# Patient Record
Sex: FEMALE | Race: WHITE | Employment: FULL TIME | URBAN - METROPOLITAN AREA
[De-identification: names, ages, dates, MRNs, and addresses within clinical notes are randomized per-mention and may not be internally consistent; named-entity substitution may affect disease eponyms.]

---

## 2017-01-05 ENCOUNTER — TELEPHONE (OUTPATIENT)
Dept: FAMILY MEDICINE CLINIC | Facility: CLINIC | Age: 42
End: 2017-01-05

## 2017-01-05 NOTE — TELEPHONE ENCOUNTER
Sahron/ [facility] dep asking to speak with a nurse. Smiley Sultana has questions regarding pt that needs to be answered for a form. Please advise.

## 2017-01-06 ENCOUNTER — TELEPHONE (OUTPATIENT)
Dept: HEMATOLOGY/ONCOLOGY | Facility: HOSPITAL | Age: 42
End: 2017-01-06

## 2017-01-06 NOTE — TELEPHONE ENCOUNTER
Nicole Clemente would like to know information regarding patients HIV risks. Informed her, patient is currently seeing Infectious disease.  Information for Dr. Wilkes Danger provided,

## 2017-01-06 NOTE — TELEPHONE ENCOUNTER
Left voicemail for patient on both of her phones listed that had call requesting her medical information. Left message that will not release without speaking to her first and consent.

## 2017-01-06 NOTE — TELEPHONE ENCOUNTER
PLEASE CALL [address] [facility] DEPT -[phone] REGARDING WHEN AND HOW MANY AND WHY PT   HAD TO HAVE BLOOD TRANSFUSIONS-MKV

## 2017-01-09 ENCOUNTER — TELEPHONE (OUTPATIENT)
Dept: HEMATOLOGY/ONCOLOGY | Facility: HOSPITAL | Age: 42
End: 2017-01-09

## 2017-01-09 NOTE — TELEPHONE ENCOUNTER
Pt called and to clarify that [facility] Department was calling due to she is requesting financial assistance with her medication. Pt to call Daniel Burnett at [facility] Department.

## 2017-01-10 ENCOUNTER — OFFICE VISIT (OUTPATIENT)
Dept: NUTRITION/OBESITY MEDICINE | Facility: HOSPITAL | Age: 42
End: 2017-01-10
Attending: SURGERY
Payer: COMMERCIAL

## 2017-01-10 VITALS
BODY MASS INDEX: 33.18 KG/M2 | RESPIRATION RATE: 18 BRPM | SYSTOLIC BLOOD PRESSURE: 117 MMHG | HEART RATE: 90 BPM | DIASTOLIC BLOOD PRESSURE: 78 MMHG | OXYGEN SATURATION: 100 % | WEIGHT: 211.38 LBS | HEIGHT: 67 IN

## 2017-01-10 NOTE — PROGRESS NOTES
[address] [place] [address] AND [facility]  [address] [place] [address]  Dept: [phone]  Loc: [phone]    1/10/2017    BARIATRIC EXISTING PATIENT/FOLLOW UP    HPI:  Brian Fiore is a 42year old-year old femal

## 2017-01-17 ENCOUNTER — TELEPHONE (OUTPATIENT)
Dept: FAMILY MEDICINE CLINIC | Facility: CLINIC | Age: 42
End: 2017-01-17

## 2017-01-17 NOTE — TELEPHONE ENCOUNTER
Pt is calling state that the Infection Disease Dr is recommending pt to adjust some of her medication  Pt is requesting to speak with Dr [facility] to discuss

## 2017-01-19 ENCOUNTER — TELEPHONE (OUTPATIENT)
Dept: FAMILY MEDICINE CLINIC | Facility: CLINIC | Age: 42
End: 2017-01-19

## 2017-01-19 RX ORDER — BUPROPION HYDROCHLORIDE 150 MG/1
150 TABLET, EXTENDED RELEASE ORAL DAILY
Qty: 30 TABLET | Refills: 0 | Status: SHIPPED | OUTPATIENT
Start: 2017-01-19 | End: 2017-02-08

## 2017-01-19 NOTE — TELEPHONE ENCOUNTER
genvoya     Interaction with venlaflaxine  Switch to wellbutrin. Discussed side effects. D/c completley the xanax. F/u next week.

## 2017-01-24 NOTE — PROGRESS NOTES
I'm in a 3rd party that are paying for all my meds. Switched to wellbutrin and off the venlafaxine. Off zithromax  triumeo for a week.     Still with a lot of nausea  (Has been on it since September)    \"I'm hungry now my lap band is only at 3 cc and usu

## 2017-01-26 ENCOUNTER — OFFICE VISIT (OUTPATIENT)
Dept: AUDIOLOGY | Facility: CLINIC | Age: 42
End: 2017-01-26

## 2017-01-26 ENCOUNTER — OFFICE VISIT (OUTPATIENT)
Dept: OTOLARYNGOLOGY | Facility: CLINIC | Age: 42
End: 2017-01-26

## 2017-01-26 VITALS
SYSTOLIC BLOOD PRESSURE: 121 MMHG | HEART RATE: 85 BPM | TEMPERATURE: 97 F | HEIGHT: 67 IN | RESPIRATION RATE: 20 BRPM | DIASTOLIC BLOOD PRESSURE: 83 MMHG | WEIGHT: 215 LBS | BODY MASS INDEX: 33.74 KG/M2

## 2017-01-26 DIAGNOSIS — H69.82 EUSTACHIAN TUBE DYSFUNCTION, LEFT: Primary | ICD-10-CM

## 2017-01-26 DIAGNOSIS — H69.90 EUSTACHIAN TUBE DISORDER, UNSPECIFIED LATERALITY: Primary | ICD-10-CM

## 2017-01-26 PROCEDURE — 92550 TYMPANOMETRY & REFLEX THRESH: CPT | Performed by: AUDIOLOGIST

## 2017-01-26 PROCEDURE — 92557 COMPREHENSIVE HEARING TEST: CPT | Performed by: AUDIOLOGIST

## 2017-01-26 PROCEDURE — 99212 OFFICE O/P EST SF 10 MIN: CPT | Performed by: OTOLARYNGOLOGY

## 2017-01-26 PROCEDURE — 99213 OFFICE O/P EST LOW 20 MIN: CPT | Performed by: OTOLARYNGOLOGY

## 2017-01-26 RX ORDER — FLUTICASONE PROPIONATE 50 MCG
2 SPRAY, SUSPENSION (ML) NASAL DAILY
Qty: 1 BOTTLE | Refills: 3 | Status: SHIPPED | OUTPATIENT
Start: 2017-01-26 | End: 2017-05-05

## 2017-01-27 NOTE — PROGRESS NOTES
AUDIOLOGY REPORT      Souleymane Cohen is a 42year old female     Referring Provider: No ref. provider found   YOB: 1975  Medical Record: [id number]      Patient Hearing History:  Patient reported muffled hearing and sensation of fluid in ears.

## 2017-01-27 NOTE — PROGRESS NOTES
Jami Rogers is a 42year old female. Patient presents with:  Ear Problem: Pt c/o bilateral ear fluid to bilateral ears. Pt states her ears pops and sounds like under water whenever she yawns or takes a big bite.  Pt states she can feel vibrations going to uterine bleeding) 2002     and acute pelvic pain. Laparoscopy D&C, WOLFGANG   • Mild dysplasia of cervix 2005     Colposcopy with biopsy   • Endometriosis 2005     Stage 4, pelvic pain. Laparoscopic extensive WOLFGANG, chromopertubation.   Lupron x6 mos ('03, '06, Normal Memory - Normal. Cranial nerves - Cranial nerves II through XII grossly intact.    Neck Exam Normal Inspection - Normal. Palpation - Normal. Parotid gland - Normal. Thyroid gland - Normal.   Psychiatric Normal Orientation - Oriented to time, place, p

## 2017-02-01 ENCOUNTER — TELEPHONE (OUTPATIENT)
Dept: FAMILY MEDICINE CLINIC | Facility: CLINIC | Age: 42
End: 2017-02-01

## 2017-02-01 NOTE — TELEPHONE ENCOUNTER
PT stts refill on Rx Hydrocodone 10*325 MG for 150MG for 3 scripts that run into each other Feb,march,april and may. Pt stts he will  scripts at [address] [place] [address] location.  Please advise       Current Outpatient Prescriptions:  HYDROcodone-acetaminophen 10-32

## 2017-02-02 NOTE — TELEPHONE ENCOUNTER
Dr. [facility], last OV 1.23.17. Previous rx's were for a qty of 150. I pended 3 rx's. Please sign if you agree. Thanks.

## 2017-02-04 RX ORDER — HYDROCODONE BITARTRATE AND ACETAMINOPHEN 10; 325 MG/1; MG/1
1 TABLET ORAL EVERY 6 HOURS PRN
Qty: 150 TABLET | Refills: 0 | Status: SHIPPED | OUTPATIENT
Start: 2017-04-02 | End: 2017-02-14

## 2017-02-04 RX ORDER — HYDROCODONE BITARTRATE AND ACETAMINOPHEN 10; 325 MG/1; MG/1
1 TABLET ORAL EVERY 6 HOURS PRN
Qty: 150 TABLET | Refills: 0 | Status: SHIPPED | OUTPATIENT
Start: 2017-03-03 | End: 2017-04-02

## 2017-02-04 RX ORDER — HYDROCODONE BITARTRATE AND ACETAMINOPHEN 10; 325 MG/1; MG/1
1 TABLET ORAL EVERY 6 HOURS PRN
Qty: 150 TABLET | Refills: 0 | Status: SHIPPED | OUTPATIENT
Start: 2017-02-04 | End: 2017-03-06

## 2017-02-06 RX ORDER — DULAGLUTIDE 1.5 MG/.5ML
INJECTION, SOLUTION SUBCUTANEOUS
Qty: 4 PEN | Refills: 0 | Status: SHIPPED | OUTPATIENT
Start: 2017-02-06 | End: 2017-02-15

## 2017-02-08 RX ORDER — ERGOCALCIFEROL 1.25 MG/1
CAPSULE ORAL
Qty: 4 CAPSULE | Refills: 0 | Status: SHIPPED | OUTPATIENT
Start: 2017-02-08 | End: 2017-03-01

## 2017-02-08 NOTE — TELEPHONE ENCOUNTER
LOV 7/23/16 with RTC 6 months. No F/U scheduled. Letter sent to the patient today. 30 day refill pending.

## 2017-02-09 RX ORDER — BUPROPION HYDROCHLORIDE 150 MG/1
TABLET, EXTENDED RELEASE ORAL
Qty: 30 TABLET | Refills: 4 | Status: SHIPPED | OUTPATIENT
Start: 2017-02-09 | End: 2017-04-03

## 2017-02-14 ENCOUNTER — OFFICE VISIT (OUTPATIENT)
Dept: OBGYN CLINIC | Facility: CLINIC | Age: 42
End: 2017-02-14

## 2017-02-14 VITALS
SYSTOLIC BLOOD PRESSURE: 135 MMHG | HEART RATE: 80 BPM | DIASTOLIC BLOOD PRESSURE: 89 MMHG | BODY MASS INDEX: 33.82 KG/M2 | WEIGHT: 215.5 LBS | HEIGHT: 67 IN

## 2017-02-14 DIAGNOSIS — N76.0 BV (BACTERIAL VAGINOSIS): ICD-10-CM

## 2017-02-14 DIAGNOSIS — N89.8 VAGINAL ODOR: ICD-10-CM

## 2017-02-14 DIAGNOSIS — Z21 HIV POSITIVE (HCC): ICD-10-CM

## 2017-02-14 DIAGNOSIS — Z12.72 ENCOUNTER FOR SCREENING FOR MALIGNANT NEOPLASM OF VAGINA: ICD-10-CM

## 2017-02-14 DIAGNOSIS — B96.89 BV (BACTERIAL VAGINOSIS): ICD-10-CM

## 2017-02-14 PROCEDURE — 99213 OFFICE O/P EST LOW 20 MIN: CPT | Performed by: ADVANCED PRACTICE MIDWIFE

## 2017-02-14 PROCEDURE — 87210 SMEAR WET MOUNT SALINE/INK: CPT | Performed by: ADVANCED PRACTICE MIDWIFE

## 2017-02-14 RX ORDER — HYDROCODONE BITARTRATE AND ACETAMINOPHEN 10; 325 MG/1; MG/1
1 TABLET ORAL EVERY 4 HOURS PRN
Qty: 150 TABLET | Refills: 0 | Status: SHIPPED | OUTPATIENT
Start: 2017-04-15 | End: 2017-05-15

## 2017-02-14 RX ORDER — HYDROCODONE BITARTRATE AND ACETAMINOPHEN 10; 325 MG/1; MG/1
1 TABLET ORAL EVERY 4 HOURS PRN
Qty: 150 TABLET | Refills: 0 | Status: SHIPPED | OUTPATIENT
Start: 2017-03-16 | End: 2017-04-15

## 2017-02-14 RX ORDER — ELVITEGRAVIR, COBICISTAT, EMTRICITABINE, AND TENOFOVIR ALAFENAMIDE 150; 150; 200; 10 MG/1; MG/1; MG/1; MG/1
TABLET ORAL
Refills: 5 | COMMUNITY
Start: 2017-01-25 | End: 2017-07-17 | Stop reason: ALTCHOICE

## 2017-02-14 RX ORDER — HYDROCODONE BITARTRATE AND ACETAMINOPHEN 10; 325 MG/1; MG/1
1 TABLET ORAL EVERY 4 HOURS PRN
Qty: 150 TABLET | Refills: 0 | Status: SHIPPED | OUTPATIENT
Start: 2017-02-14 | End: 2017-03-16

## 2017-02-14 NOTE — TELEPHONE ENCOUNTER
Pt states she went to pharmacy and states they were unable to re-fill the 150 Pills, because it states \"Every 6 hours\"   Pt states she only picked up 120 tablets. States normally it says \"4-6 Hours\" Please advise.        Current outpatient prescriptions

## 2017-02-15 ENCOUNTER — TELEPHONE (OUTPATIENT)
Dept: OBGYN CLINIC | Facility: CLINIC | Age: 42
End: 2017-02-15

## 2017-02-15 RX ORDER — METRONIDAZOLE 7.5 MG/G
1 GEL VAGINAL NIGHTLY
Qty: 1 TUBE | Refills: 0 | Status: SHIPPED | OUTPATIENT
Start: 2017-02-15 | End: 2017-02-15

## 2017-02-15 RX ORDER — METRONIDAZOLE 7.5 MG/G
1 GEL VAGINAL NIGHTLY
Qty: 1 TUBE | Refills: 0 | Status: SHIPPED | OUTPATIENT
Start: 2017-02-15 | End: 2017-02-22

## 2017-02-15 NOTE — TELEPHONE ENCOUNTER
Per mes metrogel 0.75% nightly for 7 days to be sent to patient's pharmacy for BV. Patient contacted informed rx sent to patient's pharmacy. Pharmacy verified.  Patient agrees with plan patient verbally understands

## 2017-02-15 NOTE — PROGRESS NOTES
HPI:    Patient ID: Odessa Hodgkins is a 42year old female who presents with vaginal irration. Pt had a SKIP 2 years ago for endometrosis. In 8/2016 pt wa sdx with HIV. Reports vaginal odor with discharge Pt is not sexually active.     Gyn Exam        Revie (METROGEL-VAGINAL) 0.75 % Vaginal Gel Place 1 Applicatorful vaginally nightly. Disp: 1 Tube Rfl: 0   HYDROcodone-acetaminophen  MG Oral Tab Take 1 tablet by mouth every 4 (four) hours as needed for Pain.  Disp: 150 tablet Rfl: 0   [START ON 3/16/2017]

## 2017-02-15 NOTE — TELEPHONE ENCOUNTER
Current Outpatient Prescriptions:  TRULICITY 1.5 FB/1.3LU Subcutaneous Solution Pen-injector INJECT 1.5MG INTO THE SKIN EVERY 7 DAYS Disp: 4 pen Rfl: 0   METFORMIN  MG Oral Tab TAKE 1 TABLET BY MOUTH TWICE DAILY WITH MEALS Disp: 60 tablet Rfl: 2

## 2017-02-15 NOTE — TELEPHONE ENCOUNTER
I have the 3 written prescriptions in my book bag and I will [email or web address]. They will be available at [email or web address] Thursday.

## 2017-02-16 LAB
GENITAL VAGINOSIS SCREEN: POSITIVE
HPV I/H RISK 1 DNA SPEC QL NAA+PROBE: POSITIVE
HPV16 DNA CVX QL PROBE+SIG AMP: NEGATIVE
HPV18 DNA CVX QL PROBE+SIG AMP: POSITIVE
PAP HISTORY (OTHER THAN LAST PAP): NORMAL
TRICHOMONAS SCREEN: NEGATIVE

## 2017-02-20 ENCOUNTER — NURSE ONLY (OUTPATIENT)
Dept: FAMILY MEDICINE CLINIC | Facility: CLINIC | Age: 42
End: 2017-02-20

## 2017-02-20 DIAGNOSIS — E53.8 VITAMIN B12 DEFICIENCY: Primary | ICD-10-CM

## 2017-02-20 PROCEDURE — 96372 THER/PROPH/DIAG INJ SC/IM: CPT | Performed by: FAMILY MEDICINE

## 2017-02-20 RX ORDER — VENLAFAXINE HYDROCHLORIDE 225 MG/1
225 TABLET, EXTENDED RELEASE ORAL DAILY
Qty: 90 TABLET | Refills: 1 | OUTPATIENT
Start: 2017-02-20 | End: 2017-03-22

## 2017-02-20 RX ADMIN — CYANOCOBALAMIN 1000 MCG: 1000 INJECTION INTRAMUSCULAR; SUBCUTANEOUS at 16:12:00

## 2017-02-20 NOTE — TELEPHONE ENCOUNTER
58Rigo Bailey DO at 1/23/2017  6:04 PM      Status: Addendum : [address], DO (Physician)     Expand All Collapse All    I'm in a 3rd party that are paying for all my meds. Switched to wellbutrin and off the venlafaxine.   Off zithromax

## 2017-02-20 NOTE — TELEPHONE ENCOUNTER
Patient came in to pick script scripts, she states she already pickup Feb script #120 tablets. She brought back the March and April script. Pt was given new March and April script. Contacted pharmacist at [facility] to verify how this can be fixed.    Ph

## 2017-02-21 ENCOUNTER — OFFICE VISIT (OUTPATIENT)
Dept: SURGERY | Facility: CLINIC | Age: 42
End: 2017-02-21

## 2017-02-21 VITALS
SYSTOLIC BLOOD PRESSURE: 115 MMHG | WEIGHT: 212.31 LBS | BODY MASS INDEX: 33.32 KG/M2 | OXYGEN SATURATION: 100 % | HEART RATE: 85 BPM | DIASTOLIC BLOOD PRESSURE: 76 MMHG | HEIGHT: 67 IN | RESPIRATION RATE: 18 BRPM

## 2017-02-21 NOTE — TELEPHONE ENCOUNTER
Pt contacted and formed of below, she states this has happened previously and they allowed her to fill the remaining quantity because the directions were different. She states anytime they change the directions they will cover according to pharmacy.      I

## 2017-02-21 NOTE — PROGRESS NOTES
[address] [place] [address] Tamar Tracey  [address]  Dept: [phone]    2/21/2017    BARIATRIC EXISTING PATIENT/FOLLOW UP    HPI:  Burnette Hodgkins is a 42year old-year old female who pre

## 2017-02-22 ENCOUNTER — TELEPHONE (OUTPATIENT)
Dept: ENDOCRINOLOGY CLINIC | Facility: CLINIC | Age: 42
End: 2017-02-22

## 2017-02-22 NOTE — TELEPHONE ENCOUNTER
Spoke with Peg Trujillo. She does not feel Trulicity is working as well as Victoza but she had to switch due to cost of Victoza. Has now found pharmacy called [facility] which will help her with the cost of prescription.  If ok with SH she would like prescription f

## 2017-02-22 NOTE — TELEPHONE ENCOUNTER
Pt is on trulicity - [facility] - she has found a way to afford it

## 2017-02-23 ENCOUNTER — TELEPHONE (OUTPATIENT)
Dept: OBGYN CLINIC | Facility: CLINIC | Age: 42
End: 2017-02-23

## 2017-02-24 RX ORDER — CYCLOBENZAPRINE HCL 10 MG
TABLET ORAL
Qty: 30 TABLET | Refills: 0 | Status: SHIPPED | OUTPATIENT
Start: 2017-02-24 | End: 2017-04-26

## 2017-02-24 NOTE — TELEPHONE ENCOUNTER
Please call this patient with Dr. Gali Trevino phone number  Also fax the chart and labs to his office

## 2017-02-24 NOTE — TELEPHONE ENCOUNTER
Called pt with Dr. Mary Del Cid phone number. Faxed pt's chart and labs to his office. Faxed pt's HPV, pap from 2/14/17 and [place]. Vag Screen and MS chart note from 2/14/17.

## 2017-02-27 NOTE — TELEPHONE ENCOUNTER
LM- let patient know prescription sent and she should return call to office if any further questions.

## 2017-03-01 RX ORDER — ERGOCALCIFEROL 1.25 MG/1
CAPSULE ORAL
Qty: 4 CAPSULE | Refills: 0 | Status: SHIPPED | OUTPATIENT
Start: 2017-03-01 | End: 2017-03-23

## 2017-03-01 NOTE — TELEPHONE ENCOUNTER
See TE from 2/22/17. Patient was switched from [facility] to [address] [place] [address]. LOV 7/23/16. F/U scheduled 5/13/17. Last vit D level 51.3 on 8/13/16.

## 2017-03-01 NOTE — TELEPHONE ENCOUNTER
Pharmacy states pt needs new RX for pen needles to go with her Victoza. She also needs refill on Vitamin D.         Current outpatient prescriptions:   •  ERGOCALCIFEROL 56102 units Oral Cap, TAKE 1 CAPSULE BY MOUTH 2 TIMES A WEEK, Disp: 4 capsule, Rfl: 0

## 2017-03-23 RX ORDER — ERGOCALCIFEROL 1.25 MG/1
CAPSULE ORAL
Qty: 4 CAPSULE | Refills: 4 | Status: SHIPPED | OUTPATIENT
Start: 2017-03-23 | End: 2017-04-13

## 2017-03-27 ENCOUNTER — TELEPHONE (OUTPATIENT)
Dept: OBGYN CLINIC | Facility: CLINIC | Age: 42
End: 2017-03-27

## 2017-03-27 ENCOUNTER — TELEPHONE (OUTPATIENT)
Dept: FAMILY MEDICINE CLINIC | Facility: CLINIC | Age: 42
End: 2017-03-27

## 2017-03-27 NOTE — TELEPHONE ENCOUNTER
Please schedule appt for patient at any location. She could be added on a Monday last nurse visit slot in [facility]. Patient may arrive late, make sure it is indicated under notes arrival time.

## 2017-03-27 NOTE — TELEPHONE ENCOUNTER
Patient called to schedule a follow up appointment and a B12 Injection visit with Dr. Don Mendez. However, she was booked for 04/15/2017 at 8:50AM. Wants to know if she can be squeezed in for something sooner.  Prefers any time Saturday and Monday evenings

## 2017-03-27 NOTE — TELEPHONE ENCOUNTER
LMTCB - if pt just wants to reschedule the injection, see message below. If pt wants to reschedule the whole appt with Dr. Kaylee Choi gave the ok for 6:30pm on Monday 4/3 (MD slot).   Constance Gonzáles @[place] 10:51 AM      04/03 under MD approval

## 2017-04-04 NOTE — PROGRESS NOTES
\"Heart will be uncontrollably for 5 or 10 seconds and then it will catch back into rhythm. Its the fastest and the hardest that I've ever felt it. \"  No n/v  Doesn't happen every day.     Had hpv high risk saw Dr. Charli Gordillo at [facility].  Is to have Plan  1.  Depression with anxiety  Increase the dose of bupropion partially because most other SSRIs as an arise interfere with her HIV therapy referred to neuropsychologist  - Specialty Other Referral - In Network  - BuPROPion HCl ER, XL, 300 MG Oral Table [E]; Future  - XR CHEST PA + LAT CHEST (CPT=29290); Future  - EKG 12 Lead to be performed at [address] [place] [address] ECHO 2D DOPPLER (CPT=97979);  Future

## 2017-04-11 ENCOUNTER — LAB ENCOUNTER (OUTPATIENT)
Dept: LAB | Facility: HOSPITAL | Age: 42
End: 2017-04-11
Attending: FAMILY MEDICINE
Payer: COMMERCIAL

## 2017-04-11 ENCOUNTER — OFFICE VISIT (OUTPATIENT)
Dept: SURGERY | Facility: CLINIC | Age: 42
End: 2017-04-11

## 2017-04-11 ENCOUNTER — HOSPITAL ENCOUNTER (OUTPATIENT)
Dept: GENERAL RADIOLOGY | Facility: HOSPITAL | Age: 42
Discharge: HOME | End: 2017-04-11
Attending: FAMILY MEDICINE
Payer: COMMERCIAL

## 2017-04-11 VITALS
HEART RATE: 64 BPM | DIASTOLIC BLOOD PRESSURE: 73 MMHG | SYSTOLIC BLOOD PRESSURE: 106 MMHG | BODY MASS INDEX: 33.3 KG/M2 | WEIGHT: 212.19 LBS | HEIGHT: 67 IN | RESPIRATION RATE: 16 BRPM

## 2017-04-11 DIAGNOSIS — Z98.84 H/O GASTRIC BYPASS: ICD-10-CM

## 2017-04-11 DIAGNOSIS — R00.2 PALPITATION: Primary | ICD-10-CM

## 2017-04-11 DIAGNOSIS — R00.2 PALPITATION: ICD-10-CM

## 2017-04-11 DIAGNOSIS — T14.8XXA BRUISING: ICD-10-CM

## 2017-04-11 DIAGNOSIS — D69.6 THROMBOCYTOPENIA (HCC): ICD-10-CM

## 2017-04-11 PROCEDURE — 36415 COLL VENOUS BLD VENIPUNCTURE: CPT

## 2017-04-11 PROCEDURE — 83970 ASSAY OF PARATHORMONE: CPT

## 2017-04-11 PROCEDURE — 84630 ASSAY OF ZINC: CPT

## 2017-04-11 PROCEDURE — 85610 PROTHROMBIN TIME: CPT

## 2017-04-11 PROCEDURE — 82607 VITAMIN B-12: CPT

## 2017-04-11 PROCEDURE — 84443 ASSAY THYROID STIM HORMONE: CPT

## 2017-04-11 PROCEDURE — 83735 ASSAY OF MAGNESIUM: CPT

## 2017-04-11 PROCEDURE — 93010 ELECTROCARDIOGRAM REPORT: CPT | Performed by: FAMILY MEDICINE

## 2017-04-11 PROCEDURE — 82306 VITAMIN D 25 HYDROXY: CPT

## 2017-04-11 PROCEDURE — 71020 XR CHEST PA + LAT CHEST (CPT=71020): CPT

## 2017-04-11 PROCEDURE — 93005 ELECTROCARDIOGRAM TRACING: CPT

## 2017-04-11 PROCEDURE — 85025 COMPLETE CBC W/AUTO DIFF WBC: CPT

## 2017-04-11 PROCEDURE — 85730 THROMBOPLASTIN TIME PARTIAL: CPT

## 2017-04-11 NOTE — PROGRESS NOTES
[address], [place] [address] Tamar Tracey  [address]  Dept: [phone]    4/11/2017    BARIATRIC EXISTING PATIENT/FOLLOW UP    HPI:  Aubrey Slade is a 42year old-year old female who pre

## 2017-04-12 ENCOUNTER — TELEPHONE (OUTPATIENT)
Dept: FAMILY MEDICINE CLINIC | Facility: CLINIC | Age: 42
End: 2017-04-12

## 2017-04-12 ENCOUNTER — TELEPHONE (OUTPATIENT)
Dept: ENDOCRINOLOGY CLINIC | Facility: CLINIC | Age: 42
End: 2017-04-12

## 2017-04-12 NOTE — TELEPHONE ENCOUNTER
Pt states she just saw Odilon Rosado last week, and was advise to call today for refill on [place], states if she just calls in she can pick it up today.  Please advise on request.

## 2017-04-13 RX ORDER — HYDROCODONE BITARTRATE AND ACETAMINOPHEN 10; 325 MG/1; MG/1
1 TABLET ORAL EVERY 4 HOURS PRN
Qty: 150 TABLET | Refills: 0 | Status: SHIPPED | OUTPATIENT
Start: 2017-05-13 | End: 2017-05-02

## 2017-04-13 RX ORDER — HYDROCODONE BITARTRATE AND ACETAMINOPHEN 10; 325 MG/1; MG/1
1 TABLET ORAL EVERY 4 HOURS PRN
Qty: 150 TABLET | Refills: 0 | Status: SHIPPED | OUTPATIENT
Start: 2017-04-13 | End: 2017-05-02

## 2017-04-13 RX ORDER — ERGOCALCIFEROL 1.25 MG/1
CAPSULE ORAL
Qty: 8 CAPSULE | Refills: 4 | Status: SHIPPED | OUTPATIENT
Start: 2017-04-13 | End: 2017-07-17 | Stop reason: ALTCHOICE

## 2017-04-13 RX ORDER — HYDROCODONE BITARTRATE AND ACETAMINOPHEN 10; 325 MG/1; MG/1
1 TABLET ORAL EVERY 4 HOURS PRN
Qty: 150 TABLET | Refills: 0 | Status: SHIPPED | OUTPATIENT
Start: 2017-06-12 | End: 2017-05-02

## 2017-04-13 NOTE — TELEPHONE ENCOUNTER
Patient taking one capsule twice weekly per [facility] in [place] from 4/25/16. Incorrect quantity was sent to pharmacy. Sent corrected prescription and let patient know via [employer].

## 2017-04-14 ENCOUNTER — APPOINTMENT (OUTPATIENT)
Dept: LAB | Age: 42
End: 2017-04-14
Attending: FAMILY MEDICINE
Payer: COMMERCIAL

## 2017-04-14 DIAGNOSIS — R00.2 PALPITATION: ICD-10-CM

## 2017-04-14 PROCEDURE — 84630 ASSAY OF ZINC: CPT

## 2017-04-14 PROCEDURE — 36415 COLL VENOUS BLD VENIPUNCTURE: CPT

## 2017-04-26 RX ORDER — PEN NEEDLE, DIABETIC 32GX 5/32"
NEEDLE, DISPOSABLE MISCELLANEOUS
Qty: 100 EACH | Refills: 2 | Status: SHIPPED | OUTPATIENT
Start: 2017-04-26 | End: 2017-12-07

## 2017-04-27 RX ORDER — CYCLOBENZAPRINE HCL 10 MG
TABLET ORAL
Qty: 30 TABLET | Refills: 4 | Status: SHIPPED | OUTPATIENT
Start: 2017-04-27 | End: 2017-06-20

## 2017-05-02 ENCOUNTER — OFFICE VISIT (OUTPATIENT)
Dept: HEMATOLOGY/ONCOLOGY | Facility: HOSPITAL | Age: 42
End: 2017-05-02
Attending: INTERNAL MEDICINE
Payer: COMMERCIAL

## 2017-05-02 ENCOUNTER — LAB ENCOUNTER (OUTPATIENT)
Dept: LAB | Facility: HOSPITAL | Age: 42
End: 2017-05-02
Attending: ANESTHESIOLOGY
Payer: COMMERCIAL

## 2017-05-02 ENCOUNTER — HOSPITAL ENCOUNTER (OUTPATIENT)
Dept: CV DIAGNOSTICS | Facility: HOSPITAL | Age: 42
Discharge: HOME | End: 2017-05-02
Attending: FAMILY MEDICINE
Payer: COMMERCIAL

## 2017-05-02 ENCOUNTER — OFFICE VISIT (OUTPATIENT)
Dept: PAIN CLINIC | Facility: HOSPITAL | Age: 42
End: 2017-05-02
Attending: ANESTHESIOLOGY
Payer: COMMERCIAL

## 2017-05-02 ENCOUNTER — TELEPHONE (OUTPATIENT)
Dept: FAMILY MEDICINE CLINIC | Facility: CLINIC | Age: 42
End: 2017-05-02

## 2017-05-02 VITALS
HEART RATE: 64 BPM | DIASTOLIC BLOOD PRESSURE: 78 MMHG | SYSTOLIC BLOOD PRESSURE: 113 MMHG | HEIGHT: 67 IN | BODY MASS INDEX: 32.18 KG/M2 | WEIGHT: 205 LBS | RESPIRATION RATE: 18 BRPM

## 2017-05-02 VITALS
SYSTOLIC BLOOD PRESSURE: 119 MMHG | WEIGHT: 206.38 LBS | HEIGHT: 67.01 IN | BODY MASS INDEX: 32.39 KG/M2 | TEMPERATURE: 98 F | DIASTOLIC BLOOD PRESSURE: 71 MMHG | OXYGEN SATURATION: 100 % | HEART RATE: 59 BPM | RESPIRATION RATE: 18 BRPM

## 2017-05-02 DIAGNOSIS — R00.2 PALPITATION: ICD-10-CM

## 2017-05-02 DIAGNOSIS — D70.8 OTHER NEUTROPENIA (HCC): ICD-10-CM

## 2017-05-02 DIAGNOSIS — D50.9 IRON DEFICIENCY ANEMIA, UNSPECIFIED IRON DEFICIENCY ANEMIA TYPE: ICD-10-CM

## 2017-05-02 DIAGNOSIS — D70.9 NEUTROPENIA, UNSPECIFIED TYPE (HCC): ICD-10-CM

## 2017-05-02 DIAGNOSIS — M51.36 LUMBAR DISC NARROWING: Primary | ICD-10-CM

## 2017-05-02 DIAGNOSIS — D69.6 THROMBOCYTOPENIA (HCC): ICD-10-CM

## 2017-05-02 DIAGNOSIS — N18.30 RENAL FAILURE, CHRONIC, STAGE 3 (MODERATE) (HCC): Primary | ICD-10-CM

## 2017-05-02 DIAGNOSIS — E53.8 VITAMIN B12 DEFICIENCY: ICD-10-CM

## 2017-05-02 DIAGNOSIS — K90.89 POOR IRON ABSORPTION: ICD-10-CM

## 2017-05-02 DIAGNOSIS — D50.8 IRON DEFICIENCY ANEMIA SECONDARY TO INADEQUATE DIETARY IRON INTAKE: Primary | ICD-10-CM

## 2017-05-02 DIAGNOSIS — D50.8 OTHER IRON DEFICIENCY ANEMIA: ICD-10-CM

## 2017-05-02 DIAGNOSIS — D50.8 IRON DEFICIENCY ANEMIA SECONDARY TO INADEQUATE DIETARY IRON INTAKE: ICD-10-CM

## 2017-05-02 PROCEDURE — 83540 ASSAY OF IRON: CPT

## 2017-05-02 PROCEDURE — 84466 ASSAY OF TRANSFERRIN: CPT

## 2017-05-02 PROCEDURE — 93306 TTE W/DOPPLER COMPLETE: CPT

## 2017-05-02 PROCEDURE — 85025 COMPLETE CBC W/AUTO DIFF WBC: CPT

## 2017-05-02 PROCEDURE — 82728 ASSAY OF FERRITIN: CPT

## 2017-05-02 PROCEDURE — 80061 LIPID PANEL: CPT

## 2017-05-02 PROCEDURE — 93306 TTE W/DOPPLER COMPLETE: CPT | Performed by: INTERNAL MEDICINE

## 2017-05-02 PROCEDURE — 99211 OFF/OP EST MAY X REQ PHY/QHP: CPT

## 2017-05-02 PROCEDURE — 85060 BLOOD SMEAR INTERPRETATION: CPT

## 2017-05-02 PROCEDURE — 80053 COMPREHEN METABOLIC PANEL: CPT

## 2017-05-02 PROCEDURE — 36415 COLL VENOUS BLD VENIPUNCTURE: CPT

## 2017-05-02 PROCEDURE — 99245 OFF/OP CONSLTJ NEW/EST HI 55: CPT | Performed by: INTERNAL MEDICINE

## 2017-05-02 PROCEDURE — 93227 XTRNL ECG REC<48 HR R&I: CPT | Performed by: FAMILY MEDICINE

## 2017-05-02 NOTE — PROGRESS NOTES
05/02/17  PRESENTS AMBULATORY TO [facility];  LAST VISIT 8/2016;  PT RECEIVED INJECTION L5S1  IN 8/2016 WHICH SHE REPORTS GAVE HER MINIMAL RELIEF;  HAS HAD OTHER HEALTH ISSUES SINCE AUGUST ;  WAS UNABLE TO COME TO [facility] SOONER;   RATING HER LEVEL OF PAIN 7/10-LBP A

## 2017-05-02 NOTE — PROGRESS NOTES
Patient is here for MD consult. Hx of thrombocytopenia and anemia. Had been seeing Dr Sridhar Gaitan at [place] and pt would like to transfer her care to [facility]. Pt c/o unexplained bruising on arms and legs but more prominent on her legs.  Pt diagnosed with HIV in [place]

## 2017-05-02 NOTE — CHRONIC PAIN
Follow-up Note    HISTORY OF PRESENT ILLNESS:  Tiff Manzo is a 42year old old female, originally referred to the pain clinic by Dr. Nesbitt Friend, returns to the clinic forL5-S1 left mild paracentral bulging disc with annular tear & right mild, L3-4 right > 3   ondansetron 4 MG Oral Tablet Dispersible DIS ONE T PO  Q 8 H PRN Disp:  Rfl: 0   fluconazole 100 MG Oral Tab Take 1 tablet by mouth daily. Disp:  Rfl: 1   Sulfamethoxazole-TMP -160 MG Oral Tab per tablet Take 1 tablet by mouth daily.  Disp:  Rfl: '10), x12 mos '11   • Chronic urticaria 2011   • Vitamin D deficiency 2009   • Multiple allergies    • Cholelithiasis 2011     Laparoscopic cholecystectomy   • History of pregnancy      CSx x1 (10 lbs 12oz), SAB   • PCOS (polycystic ovarian syndrome) 2012 Grandmother      CAD   • Hypertension Maternal Grandmother    • Other[Other] [OTHER] Maternal Grandmother    • Gallstones[Other] [OTHER] Maternal Grandmother    • Other[Other] [OTHER] Maternal Grandfather    • Other[Other] [OTHER] Paternal Grandmother    • weeks postinjection

## 2017-05-04 ENCOUNTER — HOSPITAL ENCOUNTER (OUTPATIENT)
Dept: CV DIAGNOSTICS | Facility: HOSPITAL | Age: 42
Discharge: HOME | End: 2017-05-04
Attending: FAMILY MEDICINE
Payer: COMMERCIAL

## 2017-05-04 PROCEDURE — 93225 XTRNL ECG REC<48 HRS REC: CPT | Performed by: FAMILY MEDICINE

## 2017-05-05 NOTE — CONSULTS
[facility]    PATIENT'S NAME: Ray Cardenas   CONSULTING PHYSICIAN: Yesenia Calderon M.D.    PATIENT ACCOUNT #: [de-identified] LOCATION: [address] [place] [address] RECORD #: [id number] YOB: 1975   CONSULTATION DATE: 05/02/2017       C[facility] CENT taking this relatively frequently. She has not had any mucosal bleeding, specifically no gum bleeding, no epistaxis, no rectal bleeding, and no vaginal bleeding. She has been to see Dr. Enrike Almendarez.   She is HPV positive and has some nonspecific vaginal and Duloxetine and seasonal allergies. SOCIAL HISTORY:  She has a significant other. She is not . She works for an . She grew up in [place]. She currently lives in [place].   She was previously seeing Dr. Onelia Maxwell for h thrush. LYMPHATICS:  She has no cervical, supraclavicular, or axillary adenopathy. LUNGS:  Resonant to percussion and clear to auscultation, with no wheezing, rales, or rhonchi. HEART:  Regular S1 and S2, with no murmur or gallop.   ABDOMEN:  No hepatosp She will continue to probably take it. I told her that the bruises are certainly not threatening and she has no evidence of a coagulopathy.   It is unlikely that she has a significant platelet function defect, though some of this may be possible in patient

## 2017-05-06 RX ORDER — FLUTICASONE PROPIONATE 50 MCG
SPRAY, SUSPENSION (ML) NASAL
Qty: 16 G | Refills: 4 | Status: SHIPPED | OUTPATIENT
Start: 2017-05-06 | End: 2017-09-19

## 2017-05-08 ENCOUNTER — OFFICE VISIT (OUTPATIENT)
Dept: FAMILY MEDICINE CLINIC | Facility: CLINIC | Age: 42
End: 2017-05-08

## 2017-05-08 VITALS
WEIGHT: 200 LBS | BODY MASS INDEX: 31 KG/M2 | DIASTOLIC BLOOD PRESSURE: 72 MMHG | TEMPERATURE: 98 F | SYSTOLIC BLOOD PRESSURE: 104 MMHG | HEART RATE: 68 BPM

## 2017-05-08 DIAGNOSIS — R00.2 PALPITATION: ICD-10-CM

## 2017-05-08 DIAGNOSIS — R73.01 IMPAIRED FASTING GLUCOSE: ICD-10-CM

## 2017-05-08 DIAGNOSIS — N18.30 RENAL FAILURE, CHRONIC, STAGE 3 (MODERATE) (HCC): Primary | ICD-10-CM

## 2017-05-08 DIAGNOSIS — E28.2 PCOS (POLYCYSTIC OVARIAN SYNDROME): ICD-10-CM

## 2017-05-08 DIAGNOSIS — F41.8 DEPRESSION WITH ANXIETY: ICD-10-CM

## 2017-05-08 PROCEDURE — 96372 THER/PROPH/DIAG INJ SC/IM: CPT | Performed by: FAMILY MEDICINE

## 2017-05-08 PROCEDURE — 99212 OFFICE O/P EST SF 10 MIN: CPT | Performed by: FAMILY MEDICINE

## 2017-05-08 PROCEDURE — 99214 OFFICE O/P EST MOD 30 MIN: CPT | Performed by: FAMILY MEDICINE

## 2017-05-08 RX ADMIN — CYANOCOBALAMIN 1000 MCG: 1000 INJECTION INTRAMUSCULAR; SUBCUTANEOUS at 19:40:00

## 2017-05-09 RX ORDER — BUPROPION HYDROCHLORIDE 300 MG/1
TABLET ORAL
Qty: 30 TABLET | Refills: 4 | Status: SHIPPED | OUTPATIENT
Start: 2017-05-09 | End: 2017-07-17 | Stop reason: ALTCHOICE

## 2017-05-09 NOTE — PROGRESS NOTES
Had .68 creatinine 8/2016  Now 1.35   gfr est >60 in 8/2016  and now 42     Acute renal failure. Will check u/s and 24 specimen    Back pain still bad. Taking too many advil \"It hurts all the time. \"    Palpitations still coming not as bad.   Echo ok  Jackie Badillo

## 2017-05-13 ENCOUNTER — OFFICE VISIT (OUTPATIENT)
Dept: ENDOCRINOLOGY CLINIC | Facility: CLINIC | Age: 42
End: 2017-05-13

## 2017-05-13 VITALS
HEIGHT: 67 IN | BODY MASS INDEX: 32.02 KG/M2 | DIASTOLIC BLOOD PRESSURE: 72 MMHG | SYSTOLIC BLOOD PRESSURE: 101 MMHG | HEART RATE: 106 BPM | WEIGHT: 204 LBS

## 2017-05-13 DIAGNOSIS — R73.01 IMPAIRED FASTING GLUCOSE: Primary | ICD-10-CM

## 2017-05-13 PROCEDURE — 83036 HEMOGLOBIN GLYCOSYLATED A1C: CPT | Performed by: INTERNAL MEDICINE

## 2017-05-13 PROCEDURE — 36416 COLLJ CAPILLARY BLOOD SPEC: CPT | Performed by: INTERNAL MEDICINE

## 2017-05-13 PROCEDURE — 99213 OFFICE O/P EST LOW 20 MIN: CPT | Performed by: INTERNAL MEDICINE

## 2017-05-13 NOTE — PROGRESS NOTES
Name: Laly Kilpatrick  Date: 5/13/2017    Referring Physician: No ref. provider found    HISTORY OF PRESENT ILLNESS   Laly Kilpatrick is a 42year old female who presents for PCOS. 34 y/o F presents for follow up evaluation of PCOS.  She initially underwent g needed for Pain., Disp: 150 tablet, Rfl: 0  •  ondansetron 4 MG Oral Tablet Dispersible, DIS ONE T PO  Q 8 H PRN, Disp: , Rfl: 0  •  fluconazole 100 MG Oral Tab, Take 1 tablet by mouth daily. , Disp: , Rfl: 1  •  Sulfamethoxazole-TMP -160 MG Oral Tab Spontaneous ecchymoses 04/15/2014   • Anemia    • Obesity    • H/O gastric bypass    • Hx of laparoscopic gastric banding    • Alexandre-Zana disease (HCC)      oral steroids   • H/O vertigo    • Mild intermittent asthma 1996     medication   • Sciatica tenderness   Musculoskeletal:  normal muscle strength and tone  Skin:  normal moisture and skin texture  Hair & Nails:  normal scalp hair     Neuro:  sensory grossly intact and motor grossly intact  Psychiatric:  oriented to time, self, and place  Nutritio

## 2017-05-14 ENCOUNTER — HOSPITAL ENCOUNTER (OUTPATIENT)
Dept: ULTRASOUND IMAGING | Age: 42
Discharge: HOME | End: 2017-05-14
Attending: FAMILY MEDICINE
Payer: COMMERCIAL

## 2017-05-14 DIAGNOSIS — N18.30 RENAL FAILURE, CHRONIC, STAGE 3 (MODERATE) (HCC): ICD-10-CM

## 2017-05-14 PROCEDURE — 76775 US EXAM ABDO BACK WALL LIM: CPT | Performed by: FAMILY MEDICINE

## 2017-05-21 ENCOUNTER — LAB ENCOUNTER (OUTPATIENT)
Dept: LAB | Facility: HOSPITAL | Age: 42
End: 2017-05-21
Attending: FAMILY MEDICINE
Payer: COMMERCIAL

## 2017-05-21 DIAGNOSIS — N18.30 RENAL FAILURE, CHRONIC, STAGE 3 (MODERATE) (HCC): ICD-10-CM

## 2017-05-21 PROCEDURE — 83876 ASSAY MYELOPEROXIDASE: CPT

## 2017-05-21 PROCEDURE — 84165 PROTEIN E-PHORESIS SERUM: CPT

## 2017-05-21 PROCEDURE — 81003 URINALYSIS AUTO W/O SCOPE: CPT

## 2017-05-21 PROCEDURE — 80074 ACUTE HEPATITIS PANEL: CPT

## 2017-05-21 PROCEDURE — 86039 ANTINUCLEAR ANTIBODIES (ANA): CPT

## 2017-05-21 PROCEDURE — 80053 COMPREHEN METABOLIC PANEL: CPT

## 2017-05-21 PROCEDURE — 36415 COLL VENOUS BLD VENIPUNCTURE: CPT

## 2017-05-21 PROCEDURE — 84156 ASSAY OF PROTEIN URINE: CPT

## 2017-05-21 PROCEDURE — 82570 ASSAY OF URINE CREATININE: CPT

## 2017-05-21 PROCEDURE — 82043 UR ALBUMIN QUANTITATIVE: CPT

## 2017-05-21 PROCEDURE — 86038 ANTINUCLEAR ANTIBODIES: CPT

## 2017-05-23 ENCOUNTER — OFFICE VISIT (OUTPATIENT)
Dept: NEPHROLOGY | Facility: CLINIC | Age: 42
End: 2017-05-23

## 2017-05-23 VITALS
BODY MASS INDEX: 32.33 KG/M2 | TEMPERATURE: 98 F | HEIGHT: 67 IN | HEART RATE: 69 BPM | RESPIRATION RATE: 18 BRPM | DIASTOLIC BLOOD PRESSURE: 70 MMHG | WEIGHT: 206 LBS | SYSTOLIC BLOOD PRESSURE: 102 MMHG

## 2017-05-23 DIAGNOSIS — N17.9 AKI (ACUTE KIDNEY INJURY) (HCC): Primary | ICD-10-CM

## 2017-05-23 PROCEDURE — 99244 OFF/OP CNSLTJ NEW/EST MOD 40: CPT | Performed by: INTERNAL MEDICINE

## 2017-05-23 PROCEDURE — 99212 OFFICE O/P EST SF 10 MIN: CPT | Performed by: INTERNAL MEDICINE

## 2017-05-23 RX ORDER — HYDROCODONE BITARTRATE AND ACETAMINOPHEN 10; 325 MG/1; MG/1
1 TABLET ORAL EVERY 6 HOURS PRN
COMMUNITY
End: 2017-07-17 | Stop reason: ALTCHOICE

## 2017-05-23 NOTE — PROGRESS NOTES
Consult Requested By: Dr. Lakesha Baron    Reason for Consult: kidney disease     HPI:     Ani Maddox is a 42 yrs old female with pmh of PCOS, HIV diagnosed in 2016, gastric bypass, lap cholecystectomy who presented today for work up and evaluation of C  2002    COLPOSCOPY, CERVIX W/UPPER ADJACENT VAGINA; W/BIOPSY(S), CERVIX  2005    Comment with biopsy    OTHER SURGICAL HISTORY  2003    Comment Open gastric bypass    OTHER SURGICAL HISTORY  2002    Comment Laparoscopy D&C, WOLFGANG    OTHER SURGICAL HISTORY Medications (Active prior to today's visit):    Current Outpatient Prescriptions:  HYDROcodone-acetaminophen  MG Oral Tab Take 1 tablet by mouth every 6 (six) hours as needed for Pain.  Disp:  Rfl:    BUPROPION HCL ER, XL, 300 MG Oral Tablet 24 Hr for easy bleeding and easy bruising  Integumentary:  Negative for pruritus and rash  Musculoskeletal:  Negative for bone/joint symptoms  Neurological:  Negative for gait disturbance  Psychiatric:  Negative for inappropriate interaction and psychiatric symp

## 2017-05-31 PROBLEM — R87.612 LGSIL ON PAP SMEAR OF CERVIX: Status: ACTIVE | Noted: 2017-05-31

## 2017-06-01 ENCOUNTER — TELEPHONE (OUTPATIENT)
Dept: FAMILY MEDICINE CLINIC | Facility: CLINIC | Age: 42
End: 2017-06-01

## 2017-06-01 DIAGNOSIS — R76.8 ANA POSITIVE: Primary | ICD-10-CM

## 2017-06-02 NOTE — TELEPHONE ENCOUNTER
Spoke to patient  Labs ordered    Confirm with lab the [facility] test order is ok.

## 2017-06-03 ENCOUNTER — LAB ENCOUNTER (OUTPATIENT)
Dept: LAB | Age: 42
End: 2017-06-03
Attending: INTERNAL MEDICINE
Payer: COMMERCIAL

## 2017-06-03 DIAGNOSIS — R76.8 ANA POSITIVE: ICD-10-CM

## 2017-06-03 DIAGNOSIS — N17.9 AKI (ACUTE KIDNEY INJURY) (HCC): ICD-10-CM

## 2017-06-03 PROCEDURE — 86235 NUCLEAR ANTIGEN ANTIBODY: CPT

## 2017-06-03 PROCEDURE — 80048 BASIC METABOLIC PNL TOTAL CA: CPT

## 2017-06-03 PROCEDURE — 86225 DNA ANTIBODY NATIVE: CPT

## 2017-06-03 PROCEDURE — 36415 COLL VENOUS BLD VENIPUNCTURE: CPT

## 2017-06-06 NOTE — PROGRESS NOTES
Patient's Personal History/Story    PT OF [facility] SINCE 2013 FOR LOW BACK PAIN. H/O LAP BAND AND GASTRIC BYPASS 2003 AND 2009. HAS LOST 235LBS. RETURNS TO [facility] WITH C/O OF    LBP RADIATING INTO HER BILAT.  BUTTOCK; REPORTS   THE CAUDAL INJ. 8/2014 THAT LASTED UN

## 2017-06-15 ENCOUNTER — TELEPHONE (OUTPATIENT)
Dept: NEPHROLOGY | Facility: CLINIC | Age: 42
End: 2017-06-15

## 2017-06-15 DIAGNOSIS — N18.30 CKD (CHRONIC KIDNEY DISEASE), STAGE III (HCC): Primary | ICD-10-CM

## 2017-06-15 NOTE — TELEPHONE ENCOUNTER
Spoke to Dr. Janie Garcia in the office now about ordering  lab (this was done) but patient states she cannot take time off work next week to see Dr. Janie Garcia as she advised.  Patient would like to communicate with Dr. Janie Garcia via My Chart after results are in

## 2017-06-15 NOTE — TELEPHONE ENCOUNTER
Sent call to RN - Pt states she was dx with stage 3 kidney failure & requesting orders for kidney function test.  Pt states she is on her way to get labs done. Pls call. Thank you.

## 2017-06-16 ENCOUNTER — TELEPHONE (OUTPATIENT)
Dept: FAMILY MEDICINE CLINIC | Facility: CLINIC | Age: 42
End: 2017-06-16

## 2017-06-16 NOTE — TELEPHONE ENCOUNTER
Patient accounts - Luis Alberto Chopra. Calling states they are having issues with insurance covering injections leuprolide acetate 06/25/16.  To be able to have injection covered medical records will be needed Fax , OV notes to establish medical necessi

## 2017-06-16 NOTE — TELEPHONE ENCOUNTER
Cecilio Cedeño informed, [facility] is not the perscriber for the medication. Unsure which specialist has prescribed medication. Vi Santiago to contact the patient.  If medical records from our clinic are needed, informed her [place] form will need to be completed in o

## 2017-06-21 ENCOUNTER — TELEPHONE (OUTPATIENT)
Dept: NEPHROLOGY | Facility: CLINIC | Age: 42
End: 2017-06-21

## 2017-06-21 DIAGNOSIS — N17.9 AKI (ACUTE KIDNEY INJURY) (HCC): Primary | ICD-10-CM

## 2017-06-21 RX ORDER — LIRAGLUTIDE 6 MG/ML
INJECTION SUBCUTANEOUS
Qty: 9 ML | Refills: 4 | Status: SHIPPED | OUTPATIENT
Start: 2017-06-21 | End: 2017-10-25

## 2017-06-21 NOTE — TELEPHONE ENCOUNTER
Pt was taken off of metformin - looking for blood work results from last week - so she can go back on med

## 2017-06-24 RX ORDER — CYCLOBENZAPRINE HCL 10 MG
TABLET ORAL
Qty: 30 TABLET | Refills: 4 | Status: SHIPPED | OUTPATIENT
Start: 2017-06-24 | End: 2017-09-19

## 2017-06-26 NOTE — TELEPHONE ENCOUNTER
Patient returned call. Dr. Sid Montgomery's advice relayed to re start Metformin, do Renal panel in 8 weeks and schedule an office visit. Appointment booked for Thursday 8/24/17 at 5:00PM (patient requested late time slot) Lab order entered in system.

## 2017-07-05 ENCOUNTER — TELEPHONE (OUTPATIENT)
Dept: FAMILY MEDICINE CLINIC | Facility: CLINIC | Age: 42
End: 2017-07-05

## 2017-07-13 NOTE — TELEPHONE ENCOUNTER
Pt will need to  rx script for med listed below: Pt available to  script at either location. Current Outpatient Prescriptions:  HYDROcodone-acetaminophen  MG Oral Tab Take 1 tablet by mouth every 6 (six) hours as needed for Pain.

## 2017-07-17 ENCOUNTER — OFFICE VISIT (OUTPATIENT)
Dept: FAMILY MEDICINE CLINIC | Facility: CLINIC | Age: 42
End: 2017-07-17

## 2017-07-17 VITALS
SYSTOLIC BLOOD PRESSURE: 117 MMHG | HEART RATE: 84 BPM | BODY MASS INDEX: 33.12 KG/M2 | WEIGHT: 211 LBS | DIASTOLIC BLOOD PRESSURE: 76 MMHG | HEIGHT: 67 IN

## 2017-07-17 DIAGNOSIS — R73.01 IMPAIRED FASTING GLUCOSE: ICD-10-CM

## 2017-07-17 DIAGNOSIS — M54.31 SCIATIC PAIN, RIGHT: ICD-10-CM

## 2017-07-17 DIAGNOSIS — N18.30 RENAL FAILURE, CHRONIC, STAGE 3 (MODERATE) (HCC): ICD-10-CM

## 2017-07-17 DIAGNOSIS — E53.8 VITAMIN B12 DEFICIENCY: Primary | ICD-10-CM

## 2017-07-17 PROCEDURE — 96372 THER/PROPH/DIAG INJ SC/IM: CPT | Performed by: FAMILY MEDICINE

## 2017-07-17 PROCEDURE — 99212 OFFICE O/P EST SF 10 MIN: CPT | Performed by: FAMILY MEDICINE

## 2017-07-17 PROCEDURE — 99214 OFFICE O/P EST MOD 30 MIN: CPT | Performed by: FAMILY MEDICINE

## 2017-07-17 RX ORDER — SULFAMETHOXAZOLE AND TRIMETHOPRIM 800; 160 MG/1; MG/1
1 TABLET ORAL
COMMUNITY
Start: 2017-05-22 | End: 2019-07-23

## 2017-07-17 RX ORDER — ONDANSETRON 4 MG/1
TABLET, ORALLY DISINTEGRATING ORAL
COMMUNITY
Start: 2017-05-22

## 2017-07-17 RX ORDER — CLOTRIMAZOLE 1 %
CREAM (GRAM) TOPICAL
COMMUNITY
Start: 2017-06-20 | End: 2019-11-05

## 2017-07-17 RX ORDER — DULOXETIN HYDROCHLORIDE 30 MG/1
30 CAPSULE, DELAYED RELEASE ORAL 2 TIMES DAILY
COMMUNITY
Start: 2017-06-14

## 2017-07-17 RX ORDER — FLUTICASONE PROPIONATE 50 MCG
2 SPRAY, SUSPENSION (ML) NASAL
COMMUNITY
Start: 2017-05-11 | End: 2017-07-17 | Stop reason: ALTCHOICE

## 2017-07-17 RX ORDER — FLUCONAZOLE 100 MG/1
100 TABLET ORAL
COMMUNITY
Start: 2017-05-22 | End: 2019-11-05 | Stop reason: ALTCHOICE

## 2017-07-17 RX ORDER — HYDROCODONE BITARTRATE AND ACETAMINOPHEN 10; 325 MG/1; MG/1
1 TABLET ORAL EVERY 6 HOURS PRN
Qty: 150 TABLET | Refills: 0 | Status: SHIPPED | OUTPATIENT
Start: 2017-08-16 | End: 2017-07-17

## 2017-07-17 RX ORDER — HYDROCODONE BITARTRATE AND ACETAMINOPHEN 10; 325 MG/1; MG/1
1 TABLET ORAL EVERY 4 HOURS PRN
Qty: 150 TABLET | Refills: 0 | Status: CANCELLED | OUTPATIENT
Start: 2017-07-17 | End: 2017-08-16

## 2017-07-17 RX ORDER — HYDROCODONE BITARTRATE AND ACETAMINOPHEN 10; 325 MG/1; MG/1
1 TABLET ORAL EVERY 6 HOURS PRN
Qty: 150 TABLET | Refills: 0 | Status: SHIPPED | OUTPATIENT
Start: 2017-07-17 | End: 2017-07-17

## 2017-07-17 RX ORDER — HYDROCODONE BITARTRATE AND ACETAMINOPHEN 10; 325 MG/1; MG/1
1 TABLET ORAL
COMMUNITY
Start: 2017-06-12 | End: 2017-07-17

## 2017-07-17 RX ORDER — ESZOPICLONE 3 MG/1
3 TABLET, FILM COATED ORAL
COMMUNITY
Start: 2017-06-14 | End: 2019-11-05

## 2017-07-17 RX ORDER — ERGOCALCIFEROL 1.25 MG/1
50000 CAPSULE ORAL
COMMUNITY
Start: 2017-05-11 | End: 2017-08-07

## 2017-07-17 RX ORDER — HYDROCODONE BITARTRATE AND ACETAMINOPHEN 10; 325 MG/1; MG/1
1 TABLET ORAL EVERY 6 HOURS PRN
Qty: 150 TABLET | Refills: 0 | Status: SHIPPED | OUTPATIENT
Start: 2017-09-15 | End: 2017-07-17

## 2017-07-17 RX ADMIN — CYANOCOBALAMIN 1000 MCG: 1000 INJECTION INTRAMUSCULAR; SUBCUTANEOUS at 17:14:00

## 2017-07-17 NOTE — TELEPHONE ENCOUNTER
LOV: 5/8/17 and today 7/17/17 scheduled at 5:40pm. Last Refill: 6/12/17. Script pended, please advise or print script at [employer]'s appt.         Refill Protocol Appointment Criteria  · Appointment scheduled in the past 6 months or in the next 3 months  Rece

## 2017-07-17 NOTE — PROGRESS NOTES
Has seen ID [facility] K[facility] Sor[facility]  Medication has no side effects and results not in yet whether its work  No testing scheduled yet. Kidney function same. Pt back on metformin   She suspects it was the ibuprofen.     Increased stress  Dx with add major depr

## 2017-07-18 NOTE — TELEPHONE ENCOUNTER
Pt states Dr. David Washington, wrote her Rx incorrectly, states this happens every month, states she is upset. Pharmacy needs clarification on Rx asap. Please advise.         Current Outpatient Prescriptions:   •  HYDROcodone-acetaminophen  MG Oral Tab, Ta

## 2017-07-18 NOTE — TELEPHONE ENCOUNTER
Patient stating Kori Harrison script was written for 150 tablets but the administration instructions don't match that quantity so script needs to be changed to \" take every 4-6 hours\" so she can get the correct # of pills prescribed.  She said she can bring back

## 2017-07-18 NOTE — TELEPHONE ENCOUNTER
Spoke to Dr. Coretta Dumont who is ok to change script from every 6 hours to every 4-6 hours. Called [facility] in [place] and updated information with jayshree Gagnon. Let patient know that the script was updated and it was ready for her to .

## 2017-07-21 ENCOUNTER — PATIENT MESSAGE (OUTPATIENT)
Dept: FAMILY MEDICINE CLINIC | Facility: CLINIC | Age: 42
End: 2017-07-21

## 2017-07-21 RX ORDER — HYDROCODONE BITARTRATE AND ACETAMINOPHEN 10; 325 MG/1; MG/1
TABLET ORAL
Qty: 150 TABLET | Refills: 0 | Status: SHIPPED | OUTPATIENT
Start: 2017-07-21 | End: 2017-08-15

## 2017-07-21 RX ORDER — HYDROCODONE BITARTRATE AND ACETAMINOPHEN 10; 325 MG/1; MG/1
TABLET ORAL
Qty: 150 TABLET | Refills: 0 | Status: SHIPPED | OUTPATIENT
Start: 2017-09-18 | End: 2017-10-17

## 2017-07-21 RX ORDER — HYDROCODONE BITARTRATE AND ACETAMINOPHEN 10; 325 MG/1; MG/1
TABLET ORAL
Qty: 150 TABLET | Refills: 0 | Status: SHIPPED | OUTPATIENT
Start: 2017-08-16 | End: 2017-09-17

## 2017-08-07 RX ORDER — ERGOCALCIFEROL 1.25 MG/1
CAPSULE ORAL
Qty: 8 CAPSULE | Refills: 4 | Status: SHIPPED | OUTPATIENT
Start: 2017-08-07 | End: 2017-12-07

## 2017-08-08 ENCOUNTER — TELEPHONE (OUTPATIENT)
Dept: FAMILY MEDICINE CLINIC | Facility: CLINIC | Age: 42
End: 2017-08-08

## 2017-08-08 NOTE — TELEPHONE ENCOUNTER
Pt is calling state that she have some [place] paper that need to be completed   Pt want to know if Dr [facility] will fill out form requesting a call back

## 2017-08-08 NOTE — TELEPHONE ENCOUNTER
Detailed message left on VM informed patient of [facility] dept fax [phone]. Patient to call back if any questiong.  Ext [phone] until 12:00pm

## 2017-08-11 ENCOUNTER — OFFICE VISIT (OUTPATIENT)
Dept: PAIN CLINIC | Facility: HOSPITAL | Age: 42
End: 2017-08-11
Attending: NURSE PRACTITIONER
Payer: COMMERCIAL

## 2017-08-11 VITALS
BODY MASS INDEX: 33.75 KG/M2 | HEIGHT: 66 IN | SYSTOLIC BLOOD PRESSURE: 120 MMHG | WEIGHT: 210 LBS | DIASTOLIC BLOOD PRESSURE: 86 MMHG

## 2017-08-11 DIAGNOSIS — G89.29 CHRONIC MIDLINE LOW BACK PAIN WITH RIGHT-SIDED SCIATICA: Primary | ICD-10-CM

## 2017-08-11 DIAGNOSIS — M51.26 LUMBAR HERNIATED DISC: ICD-10-CM

## 2017-08-11 DIAGNOSIS — M54.41 CHRONIC MIDLINE LOW BACK PAIN WITH RIGHT-SIDED SCIATICA: Primary | ICD-10-CM

## 2017-08-11 DIAGNOSIS — M51.36 LUMBAR DISC NARROWING: Chronic | ICD-10-CM

## 2017-08-11 DIAGNOSIS — M54.16 LUMBAR RADICULOPATHY: ICD-10-CM

## 2017-08-11 PROCEDURE — 99211 OFF/OP EST MAY X REQ PHY/QHP: CPT

## 2017-08-11 NOTE — CHRONIC PAIN
Follow-up Note    HISTORY OF PRESENT ILLNESS:  Jami Rogers is a 36year old old female, originally referred to the pain clinic by . No ref. provider found, returns to the clinic for follow up. She reports minimal relief following last VIPUL.  She reports m 9 mL Rfl: 4   FLUTICASONE PROPIONATE 50 MCG/ACT Nasal Suspension USE 2 SPRAYS BY NASAL ROUTE DAILY AS DIRECTED Disp: 16 g Rfl: 4   fluconazole 100 MG Oral Tab Take 1 tablet by mouth daily. Disp:  Rfl: 1   RANITIDINE HCL OR Take by mouth daily as needed. Diagnosis Date   • Anemia    • Asthma 1996   • Back problem    • Bradycardia    • Cholelithiasis 2011    Laparoscopic cholecystectomy   • Chronic kidney disease (CKD)    • Chronic urticaria 2011   • Colitis     hospitalization in 12/2014   • Colitis 2015 (neck)    FAMILY HISTORY:  Family History   Problem Relation Age of Onset   • Arthritis Mother      Rheumatoid   • Heart Disease Father      CAD   • Diabetes Father    • Hypertension Father    • Other [OTHER] Father    • PVD [OTHER] Father    • Diabetes Ma EXTREMITY      LEFT RIGHT   Iliopsoas 5/5 5/5   Quadriceps 5/5 5/5   Foot DF 5/5 5/5   Foot EHL 5/5 5/5   Gastrocnemius 5/5 5/5       Temperature:  normal to touch bilateral upper and lower extremities  Sensation (light touch/pinprick/temperature):   Right

## 2017-08-19 ENCOUNTER — HOSPITAL ENCOUNTER (OUTPATIENT)
Dept: MRI IMAGING | Age: 42
Discharge: HOME | End: 2017-08-19
Attending: NURSE PRACTITIONER
Payer: COMMERCIAL

## 2017-08-19 DIAGNOSIS — M54.41 CHRONIC MIDLINE LOW BACK PAIN WITH RIGHT-SIDED SCIATICA: ICD-10-CM

## 2017-08-19 DIAGNOSIS — G89.29 CHRONIC MIDLINE LOW BACK PAIN WITH RIGHT-SIDED SCIATICA: ICD-10-CM

## 2017-08-19 PROCEDURE — 72148 MRI LUMBAR SPINE W/O DYE: CPT | Performed by: NURSE PRACTITIONER

## 2017-08-23 ENCOUNTER — MED REC SCAN ONLY (OUTPATIENT)
Dept: FAMILY MEDICINE CLINIC | Facility: CLINIC | Age: 42
End: 2017-08-23

## 2017-08-23 ENCOUNTER — TELEPHONE (OUTPATIENT)
Dept: PAIN CLINIC | Facility: HOSPITAL | Age: 42
End: 2017-08-23

## 2017-08-23 NOTE — TELEPHONE ENCOUNTER
Called patient to disucss MRI results. No answer. Will send patient a message via [employer]. Instructed patient to call with questions or concerns.

## 2017-08-24 ENCOUNTER — OFFICE VISIT (OUTPATIENT)
Dept: NEPHROLOGY | Facility: CLINIC | Age: 42
End: 2017-08-24

## 2017-08-24 VITALS
HEIGHT: 66 IN | BODY MASS INDEX: 34.84 KG/M2 | SYSTOLIC BLOOD PRESSURE: 101 MMHG | WEIGHT: 216.81 LBS | HEART RATE: 71 BPM | DIASTOLIC BLOOD PRESSURE: 70 MMHG

## 2017-08-24 DIAGNOSIS — N17.9 AKI (ACUTE KIDNEY INJURY) (HCC): Primary | ICD-10-CM

## 2017-08-24 PROCEDURE — 99214 OFFICE O/P EST MOD 30 MIN: CPT | Performed by: INTERNAL MEDICINE

## 2017-08-24 PROCEDURE — 99212 OFFICE O/P EST SF 10 MIN: CPT | Performed by: INTERNAL MEDICINE

## 2017-09-01 ENCOUNTER — TELEPHONE (OUTPATIENT)
Dept: ADMINISTRATIVE | Age: 42
End: 2017-09-01

## 2017-09-01 NOTE — TELEPHONE ENCOUNTER
Dr. Peck Mins pending in [facility]. Pt is requesting 1-4 days per month for intermittent time off. Do you approve? Please advise.       Thank you,  Hector Yusuf

## 2017-09-12 ENCOUNTER — OFFICE VISIT (OUTPATIENT)
Dept: SURGERY | Facility: CLINIC | Age: 42
End: 2017-09-12

## 2017-09-12 VITALS
WEIGHT: 216.19 LBS | DIASTOLIC BLOOD PRESSURE: 77 MMHG | RESPIRATION RATE: 16 BRPM | HEIGHT: 67 IN | SYSTOLIC BLOOD PRESSURE: 108 MMHG | BODY MASS INDEX: 33.93 KG/M2 | HEART RATE: 76 BPM

## 2017-09-12 NOTE — PROGRESS NOTES
[address] [place] [address] Tamar Tracey  [address]  Dept: [phone]    9/12/2017    BARIATRIC EXISTING PATIENT/FOLLOW UP    HPI:  Edgardo Magallanes is a 36year old-year old female who pre

## 2017-09-20 ENCOUNTER — TELEPHONE (OUTPATIENT)
Dept: PAIN CLINIC | Facility: HOSPITAL | Age: 42
End: 2017-09-20

## 2017-09-20 NOTE — TELEPHONE ENCOUNTER
Tasked to Dr [facility] to advise on cyclobenzaprine 10 mg and bupropion er 300mg refill request.    Refill Protocol Appointment Criteria  · Appointment scheduled in the past 6 months or in the next 3 months  Recent Outpatient Visits            1 week ago     Irena Castellano

## 2017-09-21 RX ORDER — CYCLOBENZAPRINE HCL 10 MG
TABLET ORAL
Qty: 30 TABLET | Refills: 4 | Status: SHIPPED | OUTPATIENT
Start: 2017-09-21 | End: 2017-11-22

## 2017-09-21 RX ORDER — FLUTICASONE PROPIONATE 50 MCG
SPRAY, SUSPENSION (ML) NASAL
Qty: 16 G | Refills: 4 | Status: SHIPPED | OUTPATIENT
Start: 2017-09-21 | End: 2019-11-05

## 2017-09-21 RX ORDER — BUPROPION HYDROCHLORIDE 300 MG/1
TABLET ORAL
Qty: 30 TABLET | Refills: 4 | Status: SHIPPED | OUTPATIENT
Start: 2017-09-21 | End: 2018-04-12

## 2017-09-21 NOTE — TELEPHONE ENCOUNTER
Rx request for Fluticasone Propionate 50 mcg/act, Please review. Thank you.     LOV: 1/27/17  Last Refill: 5/6/17

## 2017-10-25 RX ORDER — LIRAGLUTIDE 6 MG/ML
INJECTION SUBCUTANEOUS
Qty: 9 ML | Refills: 2 | Status: SHIPPED | OUTPATIENT
Start: 2017-10-25 | End: 2018-01-10

## 2017-11-14 ENCOUNTER — OFFICE VISIT (OUTPATIENT)
Dept: SURGERY | Facility: CLINIC | Age: 42
End: 2017-11-14

## 2017-11-14 VITALS — BODY MASS INDEX: 30.64 KG/M2 | HEIGHT: 67 IN | RESPIRATION RATE: 16 BRPM | WEIGHT: 195.25 LBS

## 2017-11-14 RX ORDER — METHYLPHENIDATE HYDROCHLORIDE 18 MG/1
10 TABLET ORAL EVERY EVENING
COMMUNITY
End: 2019-11-05

## 2017-11-14 RX ORDER — METHYLPHENIDATE HYDROCHLORIDE 54 MG/1
54 TABLET ORAL EVERY MORNING
COMMUNITY
End: 2019-11-05

## 2017-11-14 NOTE — PROGRESS NOTES
[address], [address]  Dept: [phone]    11/14/2017    BARIATRIC EXISTING PATIENT/FOLLOW UP    HPI:  Damienmikel Morfin is a 36year old-year old female who pr

## 2017-11-24 RX ORDER — CYCLOBENZAPRINE HCL 10 MG
TABLET ORAL
Qty: 30 TABLET | Refills: 0 | Status: SHIPPED | OUTPATIENT
Start: 2017-11-24 | End: 2017-12-18

## 2017-12-07 RX ORDER — ERGOCALCIFEROL 1.25 MG/1
CAPSULE ORAL
Qty: 8 CAPSULE | Refills: 1 | Status: SHIPPED | OUTPATIENT
Start: 2017-12-07 | End: 2018-01-17

## 2017-12-07 RX ORDER — PEN NEEDLE, DIABETIC 32GX 5/32"
NEEDLE, DISPOSABLE MISCELLANEOUS
Qty: 100 EACH | Refills: 4 | Status: SHIPPED | OUTPATIENT
Start: 2017-12-07

## 2017-12-21 RX ORDER — CYCLOBENZAPRINE HCL 10 MG
TABLET ORAL
Qty: 30 TABLET | Refills: 4 | Status: SHIPPED | OUTPATIENT
Start: 2017-12-21 | End: 2019-11-05

## 2017-12-26 ENCOUNTER — PATIENT MESSAGE (OUTPATIENT)
Dept: FAMILY MEDICINE CLINIC | Facility: CLINIC | Age: 42
End: 2017-12-26

## 2017-12-28 RX ORDER — HYDROCODONE BITARTRATE AND ACETAMINOPHEN 10; 325 MG/1; MG/1
TABLET ORAL
Qty: 60 TABLET | Refills: 0 | Status: SHIPPED | OUTPATIENT
Start: 2018-02-27 | End: 2018-01-03

## 2017-12-28 RX ORDER — HYDROCODONE BITARTRATE AND ACETAMINOPHEN 10; 325 MG/1; MG/1
TABLET ORAL
Qty: 150 TABLET | Refills: 0 | Status: SHIPPED | OUTPATIENT
Start: 2018-01-28 | End: 2018-02-27

## 2017-12-28 RX ORDER — HYDROCODONE BITARTRATE AND ACETAMINOPHEN 10; 325 MG/1; MG/1
TABLET ORAL
Qty: 150 TABLET | Refills: 0 | Status: SHIPPED | OUTPATIENT
Start: 2017-12-28 | End: 2018-01-28

## 2017-12-28 NOTE — TELEPHONE ENCOUNTER
Last Rx: 7/21/17 #150 x 3 scripts (last valid 9/18-10/17/17)    Medication panel pending for review. If approved, please print, sign, and ask on-site staff to inform pt when ready for .   Please respond to pool: EM [facility]    Recent Outpatient

## 2017-12-28 NOTE — TELEPHONE ENCOUNTER
From: Fatoumata Cohen  To: Cortney Mart DO  Sent: 12/26/2017 4:09 PM CST  Subject: Prescription Question    Hi Dr Sheri Lindsay,     Can I  a script for my Norco? It is not listed in the list for prescription refills. Thank you.   Emi Cohen

## 2018-01-03 NOTE — TELEPHONE ENCOUNTER
Pt came to [place] location to  RX. Pt noticed that her Rx for 02/27/18 said #60 per pt she always gets #150. Pt gave back script and asked if it can be rewritten. Rx at . .Please advise.

## 2018-01-04 RX ORDER — HYDROCODONE BITARTRATE AND ACETAMINOPHEN 10; 325 MG/1; MG/1
TABLET ORAL
Qty: 150 TABLET | Refills: 0 | Status: SHIPPED | OUTPATIENT
Start: 2018-02-27 | End: 2018-03-28

## 2018-01-09 ENCOUNTER — LAB ENCOUNTER (OUTPATIENT)
Dept: LAB | Facility: HOSPITAL | Age: 43
End: 2018-01-09
Attending: FAMILY MEDICINE
Payer: COMMERCIAL

## 2018-01-09 ENCOUNTER — OFFICE VISIT (OUTPATIENT)
Dept: SURGERY | Facility: CLINIC | Age: 43
End: 2018-01-09

## 2018-01-09 VITALS
SYSTOLIC BLOOD PRESSURE: 123 MMHG | BODY MASS INDEX: 33.74 KG/M2 | HEIGHT: 67 IN | RESPIRATION RATE: 16 BRPM | OXYGEN SATURATION: 98 % | WEIGHT: 215 LBS | HEART RATE: 77 BPM | DIASTOLIC BLOOD PRESSURE: 83 MMHG

## 2018-01-09 DIAGNOSIS — N17.9 AKI (ACUTE KIDNEY INJURY) (HCC): ICD-10-CM

## 2018-01-09 DIAGNOSIS — R73.01 IMPAIRED FASTING GLUCOSE: ICD-10-CM

## 2018-01-09 LAB
ALBUMIN SERPL BCP-MCNC: 3.5 G/DL (ref 3.5–4.8)
ANION GAP SERPL CALC-SCNC: 7 MMOL/L (ref 0–18)
BASOPHILS # BLD: 0 K/UL (ref 0–0.2)
BASOPHILS NFR BLD: 1 %
BUN SERPL-MCNC: 21 MG/DL (ref 8–20)
BUN/CREAT SERPL: 19.8 (ref 10–20)
CALCIUM SERPL-MCNC: 9.1 MG/DL (ref 8.5–10.5)
CHLORIDE SERPL-SCNC: 106 MMOL/L (ref 95–110)
CO2 SERPL-SCNC: 27 MMOL/L (ref 22–32)
CREAT SERPL-MCNC: 1.06 MG/DL (ref 0.5–1.5)
EOSINOPHIL # BLD: 0.2 K/UL (ref 0–0.7)
EOSINOPHIL NFR BLD: 6 %
ERYTHROCYTE [DISTWIDTH] IN BLOOD BY AUTOMATED COUNT: 14.2 % (ref 11–15)
GLUCOSE SERPL-MCNC: 83 MG/DL (ref 70–99)
HBA1C MFR BLD: 5.2 % (ref 4–6)
HCT VFR BLD AUTO: 32.5 % (ref 35–48)
HGB BLD-MCNC: 10.4 G/DL (ref 12–16)
LYMPHOCYTES # BLD: 0.6 K/UL (ref 1–4)
LYMPHOCYTES NFR BLD: 19 %
MCH RBC QN AUTO: 27.3 PG (ref 27–32)
MCHC RBC AUTO-ENTMCNC: 32.1 G/DL (ref 32–37)
MCV RBC AUTO: 85 FL (ref 80–100)
MONOCYTES # BLD: 0.2 K/UL (ref 0–1)
MONOCYTES NFR BLD: 8 %
NEUTROPHILS # BLD AUTO: 2 K/UL (ref 1.8–7.7)
NEUTROPHILS NFR BLD: 67 %
OSMOLALITY UR CALC.SUM OF ELEC: 292 MOSM/KG (ref 275–295)
PHOSPHATE SERPL-MCNC: 4.6 MG/DL (ref 2.4–4.7)
PLATELET # BLD AUTO: 202 K/UL (ref 140–400)
PMV BLD AUTO: 8 FL (ref 7.4–10.3)
POTASSIUM SERPL-SCNC: 5.1 MMOL/L (ref 3.3–5.1)
RBC # BLD AUTO: 3.83 M/UL (ref 3.7–5.4)
SODIUM SERPL-SCNC: 140 MMOL/L (ref 136–144)
WBC # BLD AUTO: 3 K/UL (ref 4–11)

## 2018-01-09 PROCEDURE — 83036 HEMOGLOBIN GLYCOSYLATED A1C: CPT

## 2018-01-09 PROCEDURE — 36415 COLL VENOUS BLD VENIPUNCTURE: CPT

## 2018-01-09 PROCEDURE — 84466 ASSAY OF TRANSFERRIN: CPT

## 2018-01-09 PROCEDURE — 82728 ASSAY OF FERRITIN: CPT

## 2018-01-09 PROCEDURE — 83540 ASSAY OF IRON: CPT

## 2018-01-09 PROCEDURE — 80069 RENAL FUNCTION PANEL: CPT

## 2018-01-09 PROCEDURE — 85025 COMPLETE CBC W/AUTO DIFF WBC: CPT

## 2018-01-09 NOTE — PROGRESS NOTES
[address], [place] [address] Tamar Trcaey  [address]  Dept: [phone]    1/9/2018    BARIATRIC EXISTING PATIENT/FOLLOW UP    HPI:  Jami Rogers is a 36year old-year old female who pres

## 2018-01-10 RX ORDER — LIRAGLUTIDE 6 MG/ML
INJECTION SUBCUTANEOUS
Qty: 9 ML | Refills: 0 | Status: SHIPPED | OUTPATIENT
Start: 2018-01-10 | End: 2018-01-25

## 2018-01-10 NOTE — TELEPHONE ENCOUNTER
LOV 5/2017. Letter sent in December reminding patient to schedule follow up. LMTCB today. One month supply sent per [facility] protocol.

## 2018-01-17 RX ORDER — ERGOCALCIFEROL 1.25 MG/1
CAPSULE ORAL
Qty: 2 CAPSULE | Refills: 0 | Status: SHIPPED | OUTPATIENT
Start: 2018-01-17

## 2018-01-17 NOTE — TELEPHONE ENCOUNTER
LOV 5/13/17. RTC 6 months. No F/U scheduled. Letter sent 12/7/17. Telephone message left on 1/10/18.

## 2018-01-24 ENCOUNTER — TELEPHONE (OUTPATIENT)
Dept: SURGERY | Facility: CLINIC | Age: 43
End: 2018-01-24

## 2018-01-24 NOTE — TELEPHONE ENCOUNTER
1/8/18 @ 4:18pm Spoke to Radha at [facility], [phone], RWE#[id number]. He verified that patient has following benefits for Bariatric services: No weight management criteria. No DANA/Blue Distinction required.  GUCCI ([facility]#[id number]) DX E66.01 Pt has benefits for

## 2018-01-25 RX ORDER — LIRAGLUTIDE 6 MG/ML
INJECTION SUBCUTANEOUS
Qty: 9 ML | Refills: 4 | Status: SHIPPED | OUTPATIENT
Start: 2018-01-25 | End: 2020-05-05

## 2018-02-27 ENCOUNTER — OFFICE VISIT (OUTPATIENT)
Dept: SURGERY | Facility: CLINIC | Age: 43
End: 2018-02-27

## 2018-02-27 VITALS
WEIGHT: 224 LBS | SYSTOLIC BLOOD PRESSURE: 126 MMHG | HEIGHT: 67 IN | DIASTOLIC BLOOD PRESSURE: 87 MMHG | BODY MASS INDEX: 35.16 KG/M2 | HEART RATE: 109 BPM | RESPIRATION RATE: 16 BRPM

## 2018-02-27 NOTE — PROGRESS NOTES
[address], [address]  Dept: [phone]    2/27/2018    BARIATRIC EXISTING PATIENT/FOLLOW UP    HPI:  Andre Dejesus is a 36year old-year old female who pre

## 2018-03-09 NOTE — TELEPHONE ENCOUNTER
SH- Do not fill    Patient states she is being treated by another provider for her care -do not fill prescription. Thanks.

## 2018-04-12 ENCOUNTER — OFFICE VISIT (OUTPATIENT)
Dept: SURGERY | Facility: CLINIC | Age: 43
End: 2018-04-12

## 2018-04-12 VITALS
WEIGHT: 238 LBS | SYSTOLIC BLOOD PRESSURE: 118 MMHG | DIASTOLIC BLOOD PRESSURE: 85 MMHG | HEIGHT: 67 IN | OXYGEN SATURATION: 95 % | RESPIRATION RATE: 16 BRPM | BODY MASS INDEX: 37.35 KG/M2 | HEART RATE: 77 BPM

## 2018-04-12 DIAGNOSIS — E53.8 VITAMIN B12 DEFICIENCY: ICD-10-CM

## 2018-04-12 DIAGNOSIS — E44.1 MILD PROTEIN-CALORIE MALNUTRITION (HCC): Primary | ICD-10-CM

## 2018-04-12 DIAGNOSIS — Z98.84 HX OF LAPAROSCOPIC GASTRIC BANDING: ICD-10-CM

## 2018-04-12 DIAGNOSIS — E88.81 METABOLIC SYNDROME: ICD-10-CM

## 2018-04-12 PROBLEM — E88.810 METABOLIC SYNDROME: Status: ACTIVE | Noted: 2018-04-12

## 2018-04-12 PROCEDURE — 99204 OFFICE O/P NEW MOD 45 MIN: CPT | Performed by: INTERNAL MEDICINE

## 2018-04-12 RX ORDER — HYDROCODONE BITARTRATE AND ACETAMINOPHEN 10; 325 MG/1; MG/1
TABLET ORAL
Refills: 0 | COMMUNITY
Start: 2018-04-10 | End: 2019-09-23 | Stop reason: ALTCHOICE

## 2018-04-12 NOTE — PROGRESS NOTES
[address] [place] [address] [address] [facility] [address]  Dept: [phone]    Date: 2018    Patient:  Ortencia Halsted  :      1975  MRN:      [id number]    Referring Provider: Evangelina Lopez 54 mg by mouth every morning., Disp: , Rfl:   •  Methylphenidate HCl ER 18 MG Oral Tab CR, Take 10 mg by mouth every evening., Disp: , Rfl:   •  FLUTICASONE PROPIONATE 50 MCG/ACT Nasal Suspension, USE 2 SPRAYS BY NASAL ROUTE ONCE DAILY AS DIRECTED, Disp: 1 WOLFGANG  2002: OTHER SURGICAL HISTORY      Comment: Laparoscopy D&C, WOLFGANG  2005: OTHER SURGICAL HISTORY      Comment: Laparoscopic extensive WOLFGANG, chromopertubation  2011: OTHER SURGICAL HISTORY      Comment: Panniculectomy  2011: OTHER SURGICAL HISTORY      Com for reflux symptoms  Musculoskeletal:positive for arthralgias and back pain  Neurological: positive for headaches  Behavioral/Psych: positive for stress  All other systems were reviewed and are negative    Assessment       Encounter Diagnosis(ses):   Mild

## 2018-04-24 ENCOUNTER — TELEPHONE (OUTPATIENT)
Dept: SURGERY | Facility: CLINIC | Age: 43
End: 2018-04-24

## 2018-04-24 NOTE — TELEPHONE ENCOUNTER
Per the request of Dr. Dick Horton, I called and left patient a detailed message that Dr. Dick Horton would like her to schedule an appt with our Dietitian for additional support and that per her [facility] insurance, she does have Dietitian benefits.  Please schedule pat

## 2018-07-17 ENCOUNTER — OFFICE VISIT (OUTPATIENT)
Dept: SURGERY | Facility: CLINIC | Age: 43
End: 2018-07-17
Payer: COMMERCIAL

## 2018-07-17 VITALS
SYSTOLIC BLOOD PRESSURE: 125 MMHG | HEART RATE: 88 BPM | RESPIRATION RATE: 18 BRPM | DIASTOLIC BLOOD PRESSURE: 85 MMHG | HEIGHT: 67 IN | WEIGHT: 245 LBS | OXYGEN SATURATION: 100 % | BODY MASS INDEX: 38.45 KG/M2

## 2018-07-17 NOTE — PROGRESS NOTES
[address], [place] [address] Tamar Tracey  [address]  Dept: [phone]    7/17/2018    BARIATRIC EXISTING PATIENT/FOLLOW UP    HPI:  Chelsi Dailey is a 36year old-year old female who pre

## 2018-10-24 RX ORDER — LIRAGLUTIDE 6 MG/ML
INJECTION SUBCUTANEOUS
Qty: 9 ML | Refills: 4 | OUTPATIENT
Start: 2018-10-24

## 2018-10-24 NOTE — TELEPHONE ENCOUNTER
Per TE from 3/9/18, patient is being followed by another provider and [facility] FACILITY declined refill request.    LOV 5/13/17

## 2018-12-19 ENCOUNTER — WALK IN (OUTPATIENT)
Dept: URGENT CARE | Age: 43
End: 2018-12-19

## 2018-12-19 VITALS
RESPIRATION RATE: 16 BRPM | HEART RATE: 78 BPM | WEIGHT: 210 LBS | BODY MASS INDEX: 32.96 KG/M2 | DIASTOLIC BLOOD PRESSURE: 78 MMHG | TEMPERATURE: 98.2 F | HEIGHT: 67 IN | OXYGEN SATURATION: 100 % | SYSTOLIC BLOOD PRESSURE: 118 MMHG

## 2018-12-19 DIAGNOSIS — B96.89 ACUTE BACTERIAL RHINOSINUSITIS: Primary | ICD-10-CM

## 2018-12-19 DIAGNOSIS — H10.32 ACUTE BACTERIAL CONJUNCTIVITIS OF LEFT EYE: ICD-10-CM

## 2018-12-19 DIAGNOSIS — J01.90 ACUTE BACTERIAL RHINOSINUSITIS: Primary | ICD-10-CM

## 2018-12-19 LAB
FLUAV AG UPPER RESP QL IA.RAPID: NEGATIVE
FLUBV AG UPPER RESP QL IA.RAPID: NEGATIVE
INTERNAL PROCEDURAL CONTROLS ACCEPTABLE: YES
S PYO AG THROAT QL IA.RAPID: NEGATIVE

## 2018-12-19 PROCEDURE — 87880 STREP A ASSAY W/OPTIC: CPT | Performed by: NURSE PRACTITIONER

## 2018-12-19 PROCEDURE — 87804 INFLUENZA ASSAY W/OPTIC: CPT | Performed by: NURSE PRACTITIONER

## 2018-12-19 PROCEDURE — 99203 OFFICE O/P NEW LOW 30 MIN: CPT | Performed by: NURSE PRACTITIONER

## 2018-12-19 PROCEDURE — 87081 CULTURE SCREEN ONLY: CPT | Performed by: NURSE PRACTITIONER

## 2018-12-19 RX ORDER — ONDANSETRON 4 MG/1
4 TABLET, ORALLY DISINTEGRATING ORAL EVERY 8 HOURS PRN
COMMUNITY

## 2018-12-19 RX ORDER — ERGOCALCIFEROL 1.25 MG/1
50000 CAPSULE ORAL
COMMUNITY

## 2018-12-19 RX ORDER — POLYMYXIN B SULFATE AND TRIMETHOPRIM 1; 10000 MG/ML; [USP'U]/ML
1 SOLUTION OPHTHALMIC EVERY 6 HOURS
Qty: 10 ML | Refills: 0 | Status: SHIPPED | OUTPATIENT
Start: 2018-12-19 | End: 2018-12-26

## 2018-12-19 RX ORDER — HYDROCODONE BITARTRATE AND ACETAMINOPHEN 10; 325 MG/1; MG/1
1 TABLET ORAL EVERY 6 HOURS PRN
COMMUNITY
End: 2023-05-09

## 2018-12-19 RX ORDER — DULOXETIN HYDROCHLORIDE 30 MG/1
30 CAPSULE, DELAYED RELEASE ORAL DAILY
COMMUNITY

## 2018-12-19 RX ORDER — ESZOPICLONE 3 MG/1
3 TABLET, FILM COATED ORAL
COMMUNITY

## 2018-12-19 RX ORDER — AMOXICILLIN AND CLAVULANATE POTASSIUM 875; 125 MG/1; MG/1
1 TABLET, FILM COATED ORAL EVERY 12 HOURS
Qty: 20 TABLET | Refills: 0 | Status: SHIPPED | OUTPATIENT
Start: 2018-12-19 | End: 2018-12-26

## 2018-12-21 LAB
REPORT STATUS (RPT): NORMAL
S PYO SPEC QL CULT: NORMAL
SPECIMEN SOURCE: NORMAL

## 2018-12-24 ENCOUNTER — TELEPHONE (OUTPATIENT)
Dept: URGENT CARE | Age: 43
End: 2018-12-24

## 2019-07-23 ENCOUNTER — HOSPITAL ENCOUNTER (OUTPATIENT)
Dept: GENERAL RADIOLOGY | Facility: HOSPITAL | Age: 44
Discharge: HOME | End: 2019-07-23
Attending: ORTHOPAEDIC SURGERY
Payer: COMMERCIAL

## 2019-07-23 ENCOUNTER — OFFICE VISIT (OUTPATIENT)
Dept: ORTHOPEDICS CLINIC | Facility: CLINIC | Age: 44
End: 2019-07-23
Payer: COMMERCIAL

## 2019-07-23 DIAGNOSIS — Z47.89 ORTHOPEDIC AFTERCARE: ICD-10-CM

## 2019-07-23 DIAGNOSIS — M25.562 ACUTE PAIN OF BOTH KNEES: Primary | ICD-10-CM

## 2019-07-23 DIAGNOSIS — M25.561 ACUTE PAIN OF BOTH KNEES: Primary | ICD-10-CM

## 2019-07-23 PROCEDURE — 99243 OFF/OP CNSLTJ NEW/EST LOW 30: CPT | Performed by: ORTHOPAEDIC SURGERY

## 2019-07-23 PROCEDURE — 73564 X-RAY EXAM KNEE 4 OR MORE: CPT | Performed by: ORTHOPAEDIC SURGERY

## 2019-07-23 NOTE — PROGRESS NOTES
7/23/2019  Dara Oreillypasquale  69/1975  39year old   female  Constanza Jon MD    HPI:   Patient presents with:  Consult: C/o bilateral knee pain for about a year. Has noticed \"clicking\" sounds from the knees. Recalls no specific injuries.   Nohemi Orellana Take 54 mg by mouth every morning. Disp:  Rfl:    Methylphenidate HCl ER 18 MG Oral Tab CR Take 10 mg by mouth every evening.  Disp:  Rfl:    FLUTICASONE PROPIONATE 50 MCG/ACT Nasal Suspension USE 2 SPRAYS BY NASAL ROUTE ONCE DAILY AS DIRECTED Disp: 16 g Rf PCOS/Cushings per endo   • Sciatica    • Spontaneous ecchymoses 04/15/2014   • Alexandre-Zana disease ([facility] [address]     oral steroids   • Vitamin D deficiency 2009      Past Surgical History:   Procedure Laterality Date   • ADJUSTMENT GASTRIC BAND  2009    band o Smokeless tobacco: Former User        Quit date: 8/11/2014    Alcohol use: No      Alcohol/week: 0.0 standard drinks    Drug use: No          REVIEW OF SYSTEMS:   A 12 point review of systems was performed as documented on the intake form and reviewed b performing a course of physical therapy. The patient will follow-up in 4 weeks. She continues to have right knee pain after course physical therapy is complete, she benefit from obtaining an MRI of the knee to look for any intra-articular pathology.     A

## 2019-09-13 ENCOUNTER — OFFICE VISIT (OUTPATIENT)
Dept: PAIN CLINIC | Facility: HOSPITAL | Age: 44
End: 2019-09-13
Attending: ANESTHESIOLOGY
Payer: COMMERCIAL

## 2019-09-13 VITALS
BODY MASS INDEX: 46.61 KG/M2 | SYSTOLIC BLOOD PRESSURE: 134 MMHG | HEIGHT: 66 IN | RESPIRATION RATE: 18 BRPM | DIASTOLIC BLOOD PRESSURE: 92 MMHG | WEIGHT: 290 LBS | HEART RATE: 97 BPM

## 2019-09-13 DIAGNOSIS — M51.36 LUMBAR DISC NARROWING: Primary | Chronic | ICD-10-CM

## 2019-09-13 PROCEDURE — 99201 HC OUTPT EVAL AND MGNT NEW PT LEVEL 1: CPT

## 2019-09-13 NOTE — CHRONIC PAIN
[facility]  Pain Clinic   New Consult       Patient name: Kj Mcgraw 38year old female  : 1975  MRN: [id number]  Referring MD: Riccardo Wynn DO    COMPLAINT:  Referred to the pain clinic by Satinder Glez DO  for lo H/O vertigo    • Heart block    • HIV (human immunodeficiency virus infection) ([facility]     2/2017   • HIV (human immunodeficiency virus infection) ([facility] 2017   • Hx of laparoscopic gastric banding    • Insulin resistance     metabolic syndrome   • Mild dyspl Other (Gallstones) Maternal Grandmother    • Other (Other) Maternal Grandfather    • Other (Other) Paternal Grandmother    • Other (Other) Paternal Grandfather    • Other (ovarian cancer or cervical cancer.) Paternal Aunt    • Other (testicular cancer) Mat Particles, Take 30 capsules by mouth., Disp: , Rfl:   •  Eszopiclone 3 MG Oral Tab, Take 3 mg by mouth., Disp: , Rfl:   •  Emtricitabine-Tenofovir -25 MG Oral Tab, Take 1 tablet by mouth., Disp: , Rfl:   •  Dolutegravir Sodium 50 MG Oral Tab, Take 50 Normal bilaterally   DTR:  Lower Extremities: Knee reflexes normal bilaterally  Sensation (light touch/pinprick/temperature):    Right Lower Extremity:  Normal  Left Lower Extremity: Normal  SLR: Negative  SIJ tenderness: Positive bilaterally  Kosta's test improves she will be able to reduce her usage. Follow-up in 2 weeks post injection. Comprehensive analgesic plan was formulated. Conservative vs. Aggressive measures were discussed at length including pharmacotherapy (eg.  Anti- inflammatories, muscle r

## 2019-09-13 NOTE — PROGRESS NOTES
Pt presents to [facility] ambulatory for low back pain, bilateral leg and thigh pain. Pt states she would like to have an injection has had them in the past and they have helped with her pain. PT was seen by the \"[facility]\" for medication management.

## 2019-09-17 ENCOUNTER — DOCUMENTATION ONLY (OUTPATIENT)
Dept: PAIN CLINIC | Facility: HOSPITAL | Age: 44
End: 2019-09-17

## 2019-09-17 NOTE — PROGRESS NOTES
Procedure code 39567---tuycptoos for 09/20/19    [facility] @ @ [phone] @ 7:58am     No Pa needed     Auth # [id number]    Order form sent to the surgery center, confirmation rcv'd

## 2019-09-23 ENCOUNTER — HOSPITAL ENCOUNTER (EMERGENCY)
Facility: HOSPITAL | Age: 44
Discharge: HOME | End: 2019-09-23
Attending: EMERGENCY MEDICINE
Payer: COMMERCIAL

## 2019-09-23 VITALS
DIASTOLIC BLOOD PRESSURE: 92 MMHG | TEMPERATURE: 98 F | SYSTOLIC BLOOD PRESSURE: 150 MMHG | RESPIRATION RATE: 20 BRPM | OXYGEN SATURATION: 100 % | HEART RATE: 108 BPM

## 2019-09-23 DIAGNOSIS — M54.59 INTRACTABLE LOW BACK PAIN: Primary | ICD-10-CM

## 2019-09-23 PROCEDURE — 99283 EMERGENCY DEPT VISIT LOW MDM: CPT

## 2019-09-23 RX ORDER — METHYLPREDNISOLONE 4 MG/1
TABLET ORAL
Qty: 1 PACKAGE | Refills: 0 | Status: SHIPPED | OUTPATIENT
Start: 2019-09-23

## 2019-09-23 RX ORDER — CYCLOBENZAPRINE HCL 10 MG
10 TABLET ORAL 3 TIMES DAILY PRN
Qty: 15 TABLET | Refills: 0 | Status: SHIPPED | OUTPATIENT
Start: 2019-09-23

## 2019-09-23 NOTE — ED PROVIDER NOTES
Patient Seen in: [facility] AND [facility]      History   Patient presents with:  Back Pain (musculoskeletal)    Stated Complaint:     HPI    Patient is a 71-year-old female with chronic back pain.   She was seen by pain clinic a couple weeks steroids   • Vitamin D deficiency 2009              Past Surgical History:   Procedure Laterality Date   • ADJUSTMENT GASTRIC BAND  2009    band over bypass by Dr Michelle Londono at [facility]   •       CSx x1 (10 lbs 12oz), SAB   • CHOLECYSTECTOMY  2 Left Ear: External ear normal.   Nose: Nose normal.   Mouth/Throat: Oropharynx is clear and moist.   Eyes: Conjunctivae and EOM are normal. Pupils are equal, round, and reactive to light. Neck: Neck supple  Back exam there is some lumbar tenderness.   Donnie De Leon Tab  Take 1 tablet (10 mg total) by mouth 3 (three) times daily as needed for Muscle spasms. Qty: 15 tablet Refills: 0    !! - Potential duplicate medications found. Please discuss with provider.                             EpicACT:[email or web address],R

## 2019-09-23 NOTE — ED INITIAL ASSESSMENT (HPI)
Reports bilateral low back pain since last night s/p reaching across midline to grab something. Pt reports hx of back pain and hx of injection into back Friday.

## 2019-09-23 NOTE — ED NOTES
Pt states that she had L4-L5 Steroid injection at the surgery center this past Friday- states last night went to bend over and \"I began having excruciating pain to my lower right, mid, and left back. \" Denies urinary/bowel incontinence. Denies weakness.  P

## 2019-12-23 ENCOUNTER — TELEPHONE (OUTPATIENT)
Dept: GASTROENTEROLOGY | Facility: CLINIC | Age: 44
End: 2019-12-23

## 2019-12-30 NOTE — PROGRESS NOTES
Progress Note:     Constance Weems is a 42 yrs old female with pmh of PCOS, HIV diagnosed in 2016, gastric bypass, lap cholecystectomy who presented today for follow up    Patient baseline creatinine was 0.6 - 0.7 mg/dl with an eGFR >60 ml/in last year.  Sergio Rivero 04/15/2014   • Alexandre-Zana disease ([facility]     oral steroids   • Vitamin D deficiency       Past Surgical History:  2009: ADJUSTMENT GASTRIC BAND  No date:       Comment: CSx x1 (10 lbs 12oz), SAB  2011: CHOLECYSTECTOMY  2015: COLONOSCOPY & Medications (Active prior to today's visit):    Current Outpatient Prescriptions:  L-methylfolate Calcium 15 MG Oral Tab Take 15 mg by mouth daily.  Disp:  Rfl:    ERGOCALCIFEROL 65621 units Oral Cap TAKE ONE CAPSULE BY MOUTH TWICE A WEEK Disp: Comment:Throat problems  Seasonal                      ROS:     Constitutional:  Negative for decreased activity, fever, irritability and lethargy  ENMT:  Negative for ear drainage, hearing loss and nasal drainage  Eyes:  Negative for eye discharge and vis ANCA negative was ordered which is pending  - ok to resume metformin   - discussed with patient about tenofovir related kidney injury - monitor renal function closely     Follow up in 6 months     Orders This Visit:    Orders Placed This Encounter      Saturnino [phone]

## 2020-04-07 ENCOUNTER — E-VISIT (OUTPATIENT)
Dept: FAMILY MEDICINE CLINIC | Facility: CLINIC | Age: 45
End: 2020-04-07

## 2020-04-07 DIAGNOSIS — Z20.822 SUSPECTED COVID-19 VIRUS INFECTION: Primary | ICD-10-CM

## 2020-04-08 NOTE — PROGRESS NOTES
Shaun Ochoa is a 38year old female. HPI:   See answers to questions above.      Current Outpatient Medications   Medication Sig Dispense Refill   • buprenorphine HCl-Naloxone HCl (SUBOXONE) 2-0.5 MG Sublingual Film Place 0.5 film under the tongue every Laparoscopic cholecystectomy   • Chronic kidney disease (CKD)    • Chronic urticaria 2011   • Colitis     hospitalization in 12/2014   • Colitis 2015   • Depression    • DUB (dysfunctional uterine bleeding) 2002    and acute pelvic pain.   Laparoscopy D&C, 2015    lymph node resection (neck)      Family History   Problem Relation Age of Onset   • Arthritis Mother         Rheumatoid   • Heart Disease Father         CAD   • Diabetes Father    • Hypertension Father    • Other (Other) Father    • Other (PVD) Fat

## 2020-04-08 NOTE — PATIENT INSTRUCTIONS
Coronavirus Disease 2019 (COVID-19)     [facility] is committed to the safety and well-being of our patients, members, employees, and communities.  As concerns arise about the new strain of coronavirus that causes COVID-19, [facility] 9. Avoid sharing personal items with other people in your household, like dishes, towels, and bedding   10. Clean all surfaces that are touched often, like counters, tabletops, and doorknobs.  Use household cleaning sprays or wipes according to the label in Please call your primary care provider within 2 days of your discharge to arrange for a telehealth follow-up.   Additional Information      You can also get more information at the following websites:   Centers for Disease Control & Prevention (CDC)  What t

## 2020-11-04 ENCOUNTER — TELEPHONE (OUTPATIENT)
Dept: SCHEDULING | Age: 45
End: 2020-11-04

## 2020-11-04 ENCOUNTER — WALK IN (OUTPATIENT)
Dept: URGENT CARE | Age: 45
End: 2020-11-04

## 2020-11-04 DIAGNOSIS — R09.81 CONGESTION OF NASAL SINUS: Primary | ICD-10-CM

## 2020-11-04 DIAGNOSIS — R68.89 INFLUENZA-LIKE SYMPTOMS: ICD-10-CM

## 2020-11-04 LAB
FLUAV AG UPPER RESP QL IA.RAPID: NEGATIVE
FLUBV AG UPPER RESP QL IA.RAPID: NEGATIVE
SARS-COV-2 AG RESP QL IA.RAPID: NOT DETECTED

## 2020-11-04 PROCEDURE — 87426 SARSCOV CORONAVIRUS AG IA: CPT | Performed by: NURSE PRACTITIONER

## 2020-11-04 PROCEDURE — 87804 INFLUENZA ASSAY W/OPTIC: CPT | Performed by: FAMILY MEDICINE

## 2020-11-04 PROCEDURE — 99203 OFFICE O/P NEW LOW 30 MIN: CPT | Performed by: FAMILY MEDICINE

## 2020-11-04 RX ORDER — BUDESONIDE AND FORMOTEROL FUMARATE DIHYDRATE 160; 4.5 UG/1; UG/1
2 AEROSOL RESPIRATORY (INHALATION) 2 TIMES DAILY
COMMUNITY

## 2020-11-04 RX ORDER — BUPRENORPHINE AND NALOXONE 2; .5 MG/1; MG/1
0.5 FILM, SOLUBLE BUCCAL; SUBLINGUAL 2 TIMES DAILY
COMMUNITY
Start: 2020-11-03 | End: 2023-03-02

## 2020-11-04 ASSESSMENT — PAIN SCALES - GENERAL: PAINLEVEL: 5-6

## 2021-05-26 VITALS
SYSTOLIC BLOOD PRESSURE: 131 MMHG | HEART RATE: 92 BPM | BODY MASS INDEX: 40.73 KG/M2 | WEIGHT: 259.48 LBS | TEMPERATURE: 98.7 F | DIASTOLIC BLOOD PRESSURE: 80 MMHG | OXYGEN SATURATION: 100 % | HEIGHT: 67 IN

## 2022-03-09 NOTE — TELEPHONE ENCOUNTER
Per pt she has an appt on 07/17/2017 and would like to know if she can wait until then to have her next B12 shot? Pls advise. No

## 2022-04-26 NOTE — TELEPHONE ENCOUNTER
Dr. [facility] [employer], please see message below. Thank you. Pt states Infection Disease Dr is recommending pt to adjust some of her medication.  States was informed, medication the infection disease doctor is trying to start her on has some interactions with the Xanax Fax # given to patient.

## 2023-03-02 ENCOUNTER — HOSPITAL ENCOUNTER (OUTPATIENT)
Dept: GENERAL RADIOLOGY | Age: 48
Discharge: HOME | End: 2023-03-02
Attending: FAMILY MEDICINE

## 2023-03-02 ENCOUNTER — WALK IN (OUTPATIENT)
Dept: URGENT CARE | Age: 48
End: 2023-03-02
Attending: FAMILY MEDICINE

## 2023-03-02 VITALS
SYSTOLIC BLOOD PRESSURE: 113 MMHG | HEART RATE: 64 BPM | TEMPERATURE: 98.1 F | WEIGHT: 293 LBS | DIASTOLIC BLOOD PRESSURE: 74 MMHG | HEIGHT: 67 IN | RESPIRATION RATE: 18 BRPM | OXYGEN SATURATION: 97 % | BODY MASS INDEX: 45.99 KG/M2

## 2023-03-02 DIAGNOSIS — H92.01 OTALGIA OF RIGHT EAR: Primary | ICD-10-CM

## 2023-03-02 DIAGNOSIS — Z87.01 HISTORY OF RECURRENT PNEUMONIA: ICD-10-CM

## 2023-03-02 DIAGNOSIS — J02.9 SORE THROAT: ICD-10-CM

## 2023-03-02 DIAGNOSIS — H65.93 FLUID LEVEL BEHIND TYMPANIC MEMBRANE OF BOTH EARS: ICD-10-CM

## 2023-03-02 LAB
FLUAV AG UPPER RESP QL IA.RAPID: NEGATIVE
FLUBV AG UPPER RESP QL IA.RAPID: NEGATIVE
INTERNAL PROCEDURAL CONTROLS ACCEPTABLE: YES
S PYO AG THROAT QL IA.RAPID: NEGATIVE
SARS-COV+SARS-COV-2 AG RESP QL IA.RAPID: NOT DETECTED

## 2023-03-02 PROCEDURE — 99203 OFFICE O/P NEW LOW 30 MIN: CPT

## 2023-03-02 PROCEDURE — 87428 SARSCOV & INF VIR A&B AG IA: CPT | Performed by: FAMILY MEDICINE

## 2023-03-02 PROCEDURE — 87880 STREP A ASSAY W/OPTIC: CPT | Performed by: FAMILY MEDICINE

## 2023-03-02 PROCEDURE — C9803 HOPD COVID-19 SPEC COLLECT: HCPCS

## 2023-03-02 PROCEDURE — 71046 X-RAY EXAM CHEST 2 VIEWS: CPT

## 2023-03-02 PROCEDURE — 87081 CULTURE SCREEN ONLY: CPT | Performed by: FAMILY MEDICINE

## 2023-03-02 RX ORDER — CEPHALEXIN 500 MG/1
500 CAPSULE ORAL 3 TIMES DAILY
Qty: 30 CAPSULE | Refills: 0 | Status: SHIPPED | OUTPATIENT
Start: 2023-03-02 | End: 2023-03-12

## 2023-03-02 ASSESSMENT — PAIN SCALES - GENERAL: PAINLEVEL: 4

## 2023-03-04 LAB — S PYO SPEC QL CULT: NORMAL

## 2023-05-08 ENCOUNTER — HOSPITAL ENCOUNTER (EMERGENCY)
Age: 48
Discharge: HOME | End: 2023-05-09

## 2023-05-08 VITALS
SYSTOLIC BLOOD PRESSURE: 126 MMHG | TEMPERATURE: 99 F | WEIGHT: 180.34 LBS | BODY MASS INDEX: 24.43 KG/M2 | DIASTOLIC BLOOD PRESSURE: 74 MMHG | OXYGEN SATURATION: 99 % | HEIGHT: 72 IN | HEART RATE: 61 BPM | RESPIRATION RATE: 16 BRPM

## 2023-05-08 DIAGNOSIS — K04.7 DENTAL INFECTION: ICD-10-CM

## 2023-05-08 DIAGNOSIS — K08.89 PAIN, DENTAL: Primary | ICD-10-CM

## 2023-05-08 PROCEDURE — 99283 EMERGENCY DEPT VISIT LOW MDM: CPT

## 2023-05-09 RX ORDER — AMOXICILLIN AND CLAVULANATE POTASSIUM 875; 125 MG/1; MG/1
1 TABLET, FILM COATED ORAL 2 TIMES DAILY
Qty: 20 TABLET | Refills: 0 | Status: SHIPPED | OUTPATIENT
Start: 2023-05-09 | End: 2023-05-19

## 2023-05-09 RX ORDER — NAPROXEN 500 MG/1
500 TABLET ORAL 2 TIMES DAILY WITH MEALS
Qty: 20 TABLET | Refills: 0 | Status: SHIPPED | OUTPATIENT
Start: 2023-05-09

## 2023-05-09 RX ORDER — HYDROCODONE BITARTRATE AND ACETAMINOPHEN 5; 325 MG/1; MG/1
1 TABLET ORAL EVERY 6 HOURS PRN
Qty: 9 TABLET | Refills: 0 | Status: SHIPPED | OUTPATIENT
Start: 2023-05-09

## 2023-05-09 ASSESSMENT — ENCOUNTER SYMPTOMS
GASTROINTESTINAL NEGATIVE: 1
RESPIRATORY NEGATIVE: 1
ALLERGIC/IMMUNOLOGIC NEGATIVE: 1
PSYCHIATRIC NEGATIVE: 1
CONSTITUTIONAL NEGATIVE: 1
HEMATOLOGIC/LYMPHATIC NEGATIVE: 1
NEUROLOGICAL NEGATIVE: 1
ENDOCRINE NEGATIVE: 1
EYES NEGATIVE: 1

## 2024-08-28 NOTE — TELEPHONE ENCOUNTER
----- Message from Joshua Funk RN sent at 9/18/2015  3:37 PM CDT -----  Regarding: Recall Colon  Recall colon in 5 years per CB.  Colon done 1/23/15 PT HAS AN APPT IN [facility] LID SURGERY. 5 (moderate pain)

## (undated) NOTE — MR AVS SNAPSHOT
[address]  [facility] [address] [address]  [phone]  [phone]               Thank you for choosing us for your health care visit with Pablo Ferrari MD.  We are glad to serve you and happy to provide you with Instructions and Information about Your Health     None      Allergies as of May 02, 2017     Duloxetine Nausea and vomiting    Throat problems    Seasonal                 Today's Vital Signs     BP Pulse Height Weight BMI    113/78 mmHg 64 5' 7\" (1.702 m Take 1 tablet by mouth daily. Commonly known as:  BACTRIM DS           VICTOZA 18 MG/3ML Sopn   Generic drug:  Liraglutide   Inject 1.8 mg into the skin daily.                    MyChart     Visit MyChart  You can access your MyChart to more actively [facility]

## (undated) NOTE — MR AVS SNAPSHOT
[facility] [employer] [place] [address] [phone]               Thank you for choosing us for your health care visit with Adriano Orozco MD.  We are glad to serve you and happy to provide you with this summary of you Duloxetine Nausea and vomiting    Throat problems    Seasonal                 Today's Vital Signs     BP Pulse Height Weight BMI    106/73 mmHg 64 67\" 212 lb 3.2 oz (96.253 kg) 33.23 kg/m2         Current Medications          This list is accurate as of: Commonly known as:  BACTRIM DS           VICTOZA 18 MG/3ML Sopn   Generic drug:  Liraglutide   Inject 1.8 mg into the skin daily. * Notice: This list has 2 medication(s) that are the same as other medications prescribed for you.  Read the directi

## (undated) NOTE — MR AVS SNAPSHOT
[facility]  [address] [place] [address], [address] [place] [address]               Thank you for choosing us for your health care visit with Anahi Martin. DO Brian.   We are glad to serve you and happy to provide you with this summary Take 1 tablet by mouth every 6 (six) hours as needed for Pain.    Commonly known as:  NORCO           MetFORMIN HCl 500 MG Tabs   TAKE 1 TABLET BY MOUTH TWICE DAILY WITH MEALS   Commonly known as:  GLUCOPHAGE           ondansetron 4 MG Tbdp   DIS ONE T PO Your physician has referred you to a specialist.  Your physician or the clinic staff will provide you with the phone number you should call to schedule your appointment.     [facility] [place]  [address] numbers can create reimbursement difficulties for you.     Assoc Dx:  Bilateral chronic serous otitis media [H65.23]          MyChart     Visit EMCAShart  You can access your MyChart to more actively manage your health care and view more details from this vis

## (undated) NOTE — MR AVS SNAPSHOT
[facility] [address]                Thank you for choosing us for your health care visit with Adriano Orozco MD.  We are glad to serve you and happy to provide you with this summary of you * HYDROcodone-acetaminophen  MG Tabs   Take 1 tablet by mouth every 6 (six) hours as needed for Pain. Commonly known as:  NORCO           * HYDROcodone-acetaminophen  MG Tabs   Take 1 tablet by mouth every 4 (four) hours as needed for Pain. office, you can view your past visit information in [employer]Stream by going to Visits < Visit Summaries. [employer]Stream questions? Call [phone] for help. [employer] is NOT to be used for urgent needs. For medical emergencies, dial 911.            Visit EDWARD-EL

## (undated) NOTE — LETTER
11/30/2017              Dae CHIU [address] [facility] [address]         Dear Dae Haines,    [address] [facility] records indicate that the blood tests ordered for you by Naman Zhang MD  have not been done.   If you have, in fact, already completed t

## (undated) NOTE — ED AVS SNAPSHOT
Keanu Parham   MRN: [id number]    Department:  [facility] Emergency Department   Date of Visit:  9/23/2019           Disclosure     Insurance plans vary and the physician(s) referred by the ER may not be covered by your plan.  Please contact yo CARE PHYSICIAN AT ONCE OR RETURN IMMEDIATELY TO THE EMERGENCY DEPARTMENT. If you have been prescribed any medication(s), please fill your prescription right away and begin taking the medication(s) as directed.   If you believe that any of the medications

## (undated) NOTE — Clinical Note
5/8/2017              Maurilio Cohen        [address]         Dear Dr. Alaina Cervantes has had a decrease in her kidny function since 8/2016.   Were working it up and  has appts with Dr. Dee Huerta, nephrologist.  Ramsey Farmer

## (undated) NOTE — MR AVS SNAPSHOT
[facility]  [address] [facility] [address] [facility] [address] [place] [address] [phone]               Thank you for choosing us for your health care visit with Cathleen Horton MD.  We are glad to serve you and happy to provide you with this summary of your visit. Throat problems    Seasonal                 Today's Vital Signs     BP Pulse Temp Height Weight BMI    102/70 mmHg 69 98 °F (36.7 °C) (Oral) 5' 7\" (1.702 m) 206 lb (93.441 kg) 32.26 kg/m2         Current Medications          This list is accurate as of: If you've recently had a stay at the Hospital you can access your discharge instructions in [place] by going to Visits < Admission Summaries.  If you've been to the Emergency Department or your doctor's office, you can view your past visit information in My

## (undated) NOTE — MR AVS SNAPSHOT
[address] [facility]  [phone]               Thank you for choosing us for your health care visit with Beltran Durán CNM.   We are glad to serve you and happy to provide you with this summary Commonly known as:  cyclobenzaprine           ergocalciferol 90083 units Caps   TAKE 1 CAPSULE BY MOUTH 2 TIMES A WEEK   Commonly known as:  DRISDOL/VITAMIN D2           fluconazole 100 MG Tabs   Take 1 tablet by mouth daily.    Commonly known as:  DIFLUCAN Commonly known as:  Belen Cordero   Start taking on:  4/15/2017           MetFORMIN HCl 500 MG Tabs   TAKE 1 TABLET BY MOUTH TWICE DAILY WITH MEALS   Commonly known as:  GLUCOPHAGE           ondansetron 4 MG Tbdp   DIS ONE T PO  Q 8 H PRN   Commonly known as:  ZOF For medical emergencies, dial 911.            Visit [facility] online at  [email or web address]

## (undated) NOTE — MR AVS SNAPSHOT
After Visit Summary   5/2/2017    Irina Oates    MRN: [id number]           Diagnoses this Visit     Iron deficiency anemia secondary to inadequate dietary iron intake    -  Primary     Other iron deficiency anemia         Poor iron absorption Patient Instructions     None      Please Note     If a lab draw is part of your appointment, please arrive 15 minutes early. Follow-up Instructions     Return in about 1 year (around 5/2/2018) for 1 year  Labs quarterly. .      To Do List     Thursday Summaries. If you've been to the Emergency Department or your doctor's office, you can view your past visit information in PicApp by going to Visits < Visit Summaries. PicApp questions? Call [phone] for help.   PicApp is NOT to be used for urge

## (undated) NOTE — MR AVS SNAPSHOT
[facility] [employer] [place] [address] [phone]               Thank you for choosing us for your health care visit with Cynthia Yun MD.  We are glad to serve you and happy to provide you with this summary of you DULoxetine HCl 30 MG Cpep  Take 30 capsules by mouth.   Commonly known as:  CYMBALTA        Emtricitabine-Tenofovir -25 MG Tabs  Take 1 tablet by mouth.        ergocalciferol 33594 units Caps  TAKE ONE CAPSULE BY MOUTH TWICE A WEEK  Commonly known as: discharge instructions in Floqqhart by going to Visits < Admission Summaries. If you've been to the Emergency Department or your doctor's office, you can view your past visit information in Floqqhart by going to Visits < Visit Summaries. [employer] questions?

## (undated) NOTE — MR AVS SNAPSHOT
[facility]  [address] [place] [address], [address] [place] [address]  [phone]               Thank you for choosing us for your health care visit with Nurse. We are glad to serve you and happy to provide you with this summary of your visit. Commonly known as:  FLONASE           GENVOYA 150-327-384-10 MG Tabs   Generic drug:  Hgsupjq-Xsxra-Rcatcvvc-TenofAF           * HYDROcodone-acetaminophen  MG Tabs   Take 1 tablet by mouth every 6 (six) hours as needed for Pain.    Commonly known as: [email or web address]. If you've recently had a stay at the Hospital you can access your discharge instructions in [employer] by going to Visits < Admission Summaries.  If you've been to the Emergency Department or your doctor's office, you can view yo

## (undated) NOTE — LETTER
12/23/2019    [address]  [facility] [place]            Dear Diya Castellanos,      Our records indicate that you are due for an appointment for a Colonoscopy in January 2020, or shortly there after, with [facility]

## (undated) NOTE — MR AVS SNAPSHOT
[facility]  [address] [place] [address], [address] [place] [address]               Thank you for choosing us for your health care visit with Monserrat Oconnor. DO Brian.   We are glad to serve you and happy to provide you with this summary Zinc, Plasma Or Serum [E]    Complete by: Apr 03, 2017 (Approximate)    Assoc Dx:  H/O gastric bypass [Z98.890]           PTH, Intact [E]    Complete by: Apr 03, 2017 (Approximate)    Assoc Dx:   Bruising [T14.8]           XR CHEST PA + LAT CHEST (CPT=71 acquired. Please contact the Patient Business Office at [phone] if you have any questions related to insurance coverage. Thank you. Monday April 03, 2017     Cardiology:  CARD MONITOR HOLTER 48 HOUR (JAM=[id number])    Instructions:   To schedule a te Order:   Op Referral To [facility] Hematology/Oncology Sobeida Jonas MD   [address]   [address] [place] [address]   Phone:  [phone]   Fax:  [phone]         Referral Orders      Normal Orders Th schedule your appointment. Failure to obtain required authorization numbers can create reimbursement difficulties for you.         Dr. Janes Connor MD  [phone]    Assoc Dx:  Depression with anxiety [F41.8], Memory changes [R41.3]          Shaylao Take 1 tablet by mouth every 4 (four) hours as needed for Pain.    Commonly known as:  Jami Lane   Start taking on:  4/15/2017           Insulin Pen Needle 32G X 4 MM Misc   Use needles to inject victoza once daily   Commonly known as:  BD PEN NEEDLE LUCINDA U/F

## (undated) NOTE — MR AVS SNAPSHOT
[facility] [employer] [place] [address] [phone]               Thank you for choosing us for your health care visit with Adriano Orozco MD.  We are glad to serve you and happy to provide you with this summary of you DULoxetine HCl 30 MG Cpep  Take 30 capsules by mouth.   Commonly known as:  CYMBALTA        Emtricitabine-Tenofovir -25 MG Tabs  Take 1 tablet by mouth.        ergocalciferol 40194 units Caps  TAKE ONE CAPSULE BY MOUTH TWICE A WEEK  Commonly known as: Call [phone] for help. [employer] is NOT to be used for urgent needs. For medical emergencies, dial 911.           Visit [facility] online at  Visualtisingtn

## (undated) NOTE — MR AVS SNAPSHOT
[facility] [employer] [place] [address] [phone]               Thank you for choosing us for your health care visit with Rachel Arnett MD.  We are glad to serve you and happy to provide you with this summary of you TAKE ONE CAPSULE BY MOUTH TWICE A WEEK  Commonly known as:  DRISDOL/VITAMIN D2        Eszopiclone 3 MG Tabs  Take 3 mg by mouth. * fluconazole 100 MG Tabs  Take 1 tablet by mouth daily.   Commonly known as:  DIFLUCAN        * fluconazole 100 MG Tabs discharge instructions in [employer] by going to Visits < Admission Summaries. If you've been to the Emergency Department or your doctor's office, you can view your past visit information in [employer] by going to Visits < Visit Summaries. [employer] questions?

## (undated) NOTE — MR AVS SNAPSHOT
[facility] [employer] [place] [id number] [phone]               Thank you for choosing us for your health care visit with Merlin Alston MD.  We are glad to serve you and happy to provide you with this summary of you fluconazole 100 MG Tabs  Take 100 mg by mouth.   Commonly known as:  DIFLUCAN        Fluticasone Propionate 50 MCG/ACT Susp  USE 2 SPRAYS BY NASAL ROUTE ONCE DAILY AS DIRECTED  Commonly known as:  FLONASE        HYDROcodone-acetaminophen  MG Tabs  Com

## (undated) NOTE — MR AVS SNAPSHOT
[address] [facility]  [address] [facility] [phone]               Thank you for choosing us for your health care visit with Jacki Soto MD.  We are glad to serve you and happy to provide you with Take 1 tablet by mouth daily. Commonly known as:  DIFLUCAN           * HYDROcodone-acetaminophen  MG Tabs   Take 1 tablet by mouth every 6 (six) hours as needed for Pain.    Commonly known as:  NORCO           * HYDROcodone-acetaminophen  MG T

## (undated) NOTE — MR AVS SNAPSHOT
[facility]  [address] [place] [address] [phone]               Thank you for choosing us for your health care visit with Tang Strange MD.  We are glad to serve you and happy to provide you with this summary of your visit.   Ple POC Glycohemoglobin [74824]    Complete by:  As directed    Assoc Dx: Impaired fasting glucose [R73.01]                 Follow-up Instructions     Return in about 6 months (around 11/13/2017).          Reason for Today's Visit     Obesity           Medica Sulfamethoxazole-TMP -160 MG Tabs per tablet   Take 1 tablet by mouth daily. Commonly known as:  BACTRIM DS           VICTOZA 18 MG/3ML Sopn   Generic drug:  Liraglutide   Inject 1.8 mg into the skin daily.                    Results of Recent Test

## (undated) NOTE — MR AVS SNAPSHOT
[address]  [phone]               Thank you for choosing us for your health care visit with Trey Baeza MD.  We are glad to serve you and happy to provide you with this summar © 2021-6232 The [address] [place] [address], [address] [place]. All rights reserved. This information is not intended as a substitute for professional medical care. Always follow your healthcare professional's instructions.              Fo DIS ONE T PO  Q 8 H PRN   Commonly known as:  ZOFRAN-ODT           RANITIDINE HCL OR   Take  by mouth daily. Sulfamethoxazole-TMP -160 MG Tabs per tablet   Take 1 tablet by mouth daily.    Commonly known as:  BACTRIM DS           TRIUMEQ 600 Eat plenty of low-fat dairy products High fat meats and dairy   Choose whole grain products Foods high in sodium   Water is best for hydration Fast food.    Eat at home when possible     Tips for increasing your physical activity – Adults who are physically

## (undated) NOTE — MR AVS SNAPSHOT
[place]  [address]  [phone]               Thank you for choosing us for your health care visit with Fabiola Coulter.   We are glad to serve you and happy to provide you with this duran Take 1 tablet by mouth every 6 (six) hours as needed for Pain.    Commonly known as:  NORCO           MetFORMIN HCl 500 MG Tabs   TAKE 1 TABLET BY MOUTH TWICE DAILY WITH MEALS   Commonly known as:  GLUCOPHAGE           ondansetron 4 MG Tbdp   DIS ONE T PO

## (undated) NOTE — MR AVS SNAPSHOT
[facility] [employer] [place] [address] [phone]               Thank you for choosing us for your health care visit with Jacki Soto MD.  We are glad to serve you and happy to provide you with this summary of you BD PEN NEEDLE LUICNDA U/F 32G X 4 MM Misc  Generic drug:  Insulin Pen Needle  USE NEEDLE TO INJECT VICTOZA ONCE DAILY        clotrimazole 1 % Crea  Apply topically.   Commonly known as:  LOTRIMIN        Cyclobenzaprine HCl 10 MG Tabs  TAKE ONE TABLET BY MOUTH prescribed for you. Read the directions carefully, and ask your doctor or other care provider to review them with you.                   MyChart    Visit [facility]  You can access your MyChart to more actively manage your health care and view more details fro

## (undated) NOTE — MR AVS SNAPSHOT
[facility]  [address] [place] [address], [address] [place] [address]               Thank you for choosing us for your health care visit with Merline Garduno. DO Brian.   We are glad to serve you and happy to provide you with this summary Assoc Dx:  Renal failure, chronic, stage 3 (moderate) [N18.3]           Creatinine, 24-Hour Urine [E]    Complete by: May 08, 2017    Assoc Dx:  Renal failure, chronic, stage 3 (moderate) [N18.3]           Protein, 24-Hr Urine [E]    Complete by:   May 08 [address]  [address]  [address] [place] [address]    Lucy Montoya [address]  [address] [place], [place]    It is the patient's responsibility to check with and follow their insurance [employer] [phone] MG Tabs   Generic drug:  Gphgzcp-Pdxlm-Uyvsmfvb-TenofAF           HYDROcodone-acetaminophen  MG Tabs   Take 1 tablet by mouth every 4 (four) hours as needed for Pain.    Commonly known as:  NORCO           ondansetron 4 MG Tbdp